# Patient Record
Sex: FEMALE | Race: BLACK OR AFRICAN AMERICAN | NOT HISPANIC OR LATINO | Employment: OTHER | URBAN - METROPOLITAN AREA
[De-identification: names, ages, dates, MRNs, and addresses within clinical notes are randomized per-mention and may not be internally consistent; named-entity substitution may affect disease eponyms.]

---

## 2017-01-25 ENCOUNTER — OFFICE VISIT (OUTPATIENT)
Dept: FAMILY MEDICINE | Facility: CLINIC | Age: 47
End: 2017-01-25

## 2017-01-25 VITALS
DIASTOLIC BLOOD PRESSURE: 88 MMHG | HEIGHT: 65 IN | OXYGEN SATURATION: 100 % | BODY MASS INDEX: 34.19 KG/M2 | SYSTOLIC BLOOD PRESSURE: 136 MMHG | HEART RATE: 69 BPM | TEMPERATURE: 98.1 F | WEIGHT: 205.2 LBS

## 2017-01-25 DIAGNOSIS — G56.01 CARPAL TUNNEL SYNDROME OF RIGHT WRIST: Primary | ICD-10-CM

## 2017-01-25 DIAGNOSIS — M77.11 LATERAL EPICONDYLITIS OF RIGHT ELBOW: ICD-10-CM

## 2017-01-25 RX ORDER — PREDNISONE 20 MG/1
30 TABLET ORAL DAILY
Qty: 8 TABLET | Refills: 0 | Status: SHIPPED | OUTPATIENT
Start: 2017-01-25 | End: 2017-02-09

## 2017-01-25 NOTE — PROGRESS NOTES
"There are no exam notes on file for this visit.  Chief Complaint   Patient presents with     Other     have right arm and right foot pain for quit a while now per pt.      Blood pressure 136/88, pulse 69, temperature 98.1  F (36.7  C), temperature source Oral, height 5' 5\" (165.1 cm), weight 205 lb 3.2 oz (93.078 kg), last menstrual period 01/14/2017, SpO2 100 %.    Assessment and Plan   Continued dictation on Beba Rosa:     ASSESSMENT AND PLAN:     1.  Lateral epicondylitis of right elbow:  Significant pain was noted to palpation over the lateral epicondyle and radial head.  I recommended ice and rest.  We'll give patient a work restriction.  Steroids will be used for carpal tunnel syndrome to help this as well.  She'll return in a few weeks and consider physical therapy if no significant improvement.   2.  Carpal tunnel syndrome of the right wrist:   We'll give patient new splint for the right wrist to wear at night and at work.  I recommend that she doesn't lift more than 10 pounds at work for the next few weeks We'll avoid NSAIDs and try prednisone 30 mg a day x five days.  Most recent AAFP article states that NSAIDs offer very little benefit with carpal tunnel syndrome.  We'll consider injection if not much relief from above modalities.  Return in two weeks for evaluation of both of these conditions.         Options for treatment and follow-up care were reviewed with the patient and/or guardian. Beba Rosa and/or guardian engaged in the decision making process and verbalized understanding of the options discussed and agreed with the final plan.  Patient discussed with Dr. Calvert.   DO DONALDO Garcia       Beba Rosa is a 46 year old  female SUBJECTIVE:  Beba Rosa comes in today for numbness of the fingers, as well as right arm pain.     Patient tells me that she was told she had carpal tunnel syndrome a number of months ago (some time in the middle of 2016).  She was given wrist " splint to wear at night which helped her symptoms, but that has since broken.  She also took ibuprofen for a short time, with minimal relief.  She says that this new episode of right arm pain is located just distal to the elbow on the extensor surface, and has gone on for just a few days.  The numbness in the fingers is ongoing, and she notices it especially at night and early in the morning.  She doesn't recall any recent injury or trauma.  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 Also notes trouble grasping remote and things in the refrigerator.  She denies neck pain, shoulder pain, fever, or chills.         Patient Active Problem List   Diagnosis     Health Care Home     Herpes simplex virus (HSV) infection     Illiteracy and low-level literacy     Abnormal Pap smear of cervix     Chlamydia     Current Outpatient Prescriptions   Medication Sig Dispense Refill     predniSONE (DELTASONE) 20 MG tablet Take 1.5 tablets (30 mg) by mouth daily 8 tablet 0     ibuprofen (ADVIL,MOTRIN) 400 MG tablet Take 1 tablet (400 mg) by mouth every 6 hours as needed for moderate pain 120 tablet 0     ferrous sulfate (IRON) 325 (65 FE) MG tablet Take 1 tablet (325 mg) by mouth 2 times daily 30 tablet 5     Cholecalciferol (VITAMIN D) 2000 UNITS tablet Take 2,000 Units by mouth daily 100 tablet 3     baclofen (LIORESAL) 10 MG tablet Take 0.5 tablets (5 mg) by mouth 3 times daily 20 tablet 0     metroNIDAZOLE (FLAGYL) 500 MG tablet Take 1 tablet (500 mg) by mouth 2 times daily 14 tablet 0     famciclovir (FAMVIR) 500 MG tablet Take 3 tablets (1,500 mg) by mouth once for 1 dose 3 tablet 3     Allergies   Allergen Reactions     Tizanidine      Causes lightheadedness, dizziness, and severe abdominal pain.      Family History   Problem Relation Age of Onset     DIABETES Mother      Hypertension Mother      Breast Cancer Mother      DIABETES Maternal Grandmother       "Coronary Artery Disease No family hx of      Hyperlipidemia No family hx of      Cancer - colorectal No family hx of      Ovarian Cancer No family hx of      Prostate Cancer No family hx of      Other Cancer No family hx of      Mental Illness No family hx of      CEREBROVASCULAR DISEASE No family hx of      Anesthesia Reaction No family hx of      Asthma No family hx of      OSTEOPOROSIS No family hx of      Known Genetic Syndrome No family hx of      Obesity No family hx of      Unknown/Adopted No family hx of      Colon Cancer No family hx of      Depression No family hx of      Anxiety Disorder No family hx of      MENTAL ILLNESS No family hx of      Substance Abuse No family hx of      Genetic Disorder No family hx of      Thyroid Disease No family hx of      No results found for this or any previous visit (from the past 24 hour(s)).         Review of Systems:   As Above             Physical Exam:     Filed Vitals:    01/25/17 1310   BP: 136/88   Pulse: 69   Temp: 98.1  F (36.7  C)   TempSrc: Oral   Height: 5' 5\" (165.1 cm)   Weight: 205 lb 3.2 oz (93.078 kg)   SpO2: 100%     Body mass index is 34.15 kg/(m^2).    GENERAL: healthy, alert, well nourished, well hydrated, no distress  MS: extremities- Continued dictation on Beba Shelley:    OBJECTIVE:     MUSCULOSKELETAL:  There is tenderness to palpation over the lateral epicondyle and radial head.  No bruising noted over that area.  No swelling noted over that area.  Mild tenderness noted over the extensor surface of the forearm, just distal to the elbow joint.  Positive Tinel's sign and Phalen sign.  The patient endorses numbness over the second, third, and fourth digit fingertips with those maneuvers.  No obvious atrophy of the thenar eminence.         Office Visit on 10/11/2016   Component Date Value Ref Range Status     Lab AP Case Report 10/11/2016 SEE RESULTS BELOW   Final    Comment: Gynecologic Cytology Report                       Case: [id number]         "                             Authorizing Provider:  Luis Carlos Mauricio MD         Collected:             10/11/2016 1335              First Screen:          Rosemaryreji Villalba, CT    Received:              10/12/2016 0919                                     (ASCP)                                                                         Rescreen:              JAROCHO Garrett (ASCP)                                                       Specimen:    SUREPATH PAP, SCREENING, Endocervical/cervical                                                Lab AP Gyn Interpretation 10/11/2016 SEE RESULTS BELOW   Final    Comment: Negative for squamous intraepithelial lesion or malignancy  Electronically signed by JAROCHO Garrett (ASCP) on 10/19/2016 at  2:01 PM       Lab AP Malignant? 10/11/2016 Normal  Normal Final     Specimen adequacy: 10/11/2016 Satisfactory for evaluation, endocervical/transformation zone component present   Final     HPV Reflex? 10/11/2016 Yes regardless of result   Final     High Risk? 10/11/2016 Yes   Final     Last Mens Period 10/11/2016 oct 1 2016   Final     Lab AP Abnormal Bleeding 10/11/2016 No   Final     Lab AP Patient Status 10/11/2016 not applicable   Final     Lab AP Birth Control/Hormones 10/11/2016 None   Final     Lab AP Previous Normal 10/11/2016 8/2011   Final     Lab AP Previous Abnl 10/11/2016 7/2015, ASCUS high risk HPV   Final     Lab AP Cervical Appearance 10/11/2016 normal   Final     Interpretation 10/11/2016 No HPV Type(s) Detected  No HPV Type(s) Detected, No High Risk HP Final      10/11/2016 Chuckie Michelle MD, [facility]   Final    Comment:    Testing was performed at [facility], [address] [place] [id number], with final verification at the indicated laboratory.    Interpretation was performed at [facility] [address] [place] [address].     .

## 2017-01-25 NOTE — MR AVS SNAPSHOT
After Visit Summary   1/25/2017    Beba Rosa    MRN: [id number]           Patient Information     Date Of Birth          1970        Visit Information        Provider Department      1/25/2017 1:10 PM Chucky Thorne DO [facility]        Today's Diagnoses     Carpal tunnel syndrome of right wrist    -  1       Care Instructions    Lateral epicondylitis (tennis elbow): pain over outside part of elbow due to swelling and inflammation, steroids, ice, rest    Carpel tunel: Brace and steroids    Tennis Elbow  Muscles connect to bones by thick, fibrous cords (tendons). When the muscles are overused by repeated motion, the tendons may become inflamed and painful. This condition is called tendonitis.  Tennis elbow (lateral epicondylitis) is a form of tendonitis. It occurs when the forearm muscles are used again and again in a twisting motion. Pain from tennis elbow occurs mainly on the outside of the elbow. But the pain can spread into the forearm and wrist. Your elbow may also be swollen and tender to the touch.  The pain may get worse when you move your arm or do simple activities. Bending your wrist back, shaking hands, or turning a doorknob may cause pain. The pain often gets worse after several weeks or months. Sometimes you may feel pain when your arm is still.  Tennis players who use a backhand stroke with poor technique are more likely to get tennis elbow. But playing tennis is only one cause of tennis elbow. Other common activities that can cause it include:    Hammering    Painting    Raking  Besides tennis players, people at risk include , gardeners, musicians, and dentists. Sometimes people get tennis elbow without doing anything that would cause the injury.  Treatment includes resting the arm and taking anti-inflammatory medicines. Special splints help ease symptoms. Symptoms should get better after 4 to 6 weeks of rest. You may need steroid injections if resting and  using a splint don t help. After the pain is relieved, you should change your activities so the symptoms don t return. You may need physical therapy. It may include stretching, range-of-motion, and strengthening exercises. These treatments help most cases. You may need surgery if your symptoms continue for 6 months.  Home care  Follow these guidelines when caring for yourself at home:    Rest your elbow as needed. Protect it from movement that causes pain. You may be told to use a forearm splint at night to ease symptoms in the morning. Your health care provider may recommend a special wrap or splint to compress the muscles of the forearm. This can ease pain during daytime activities. As your symptoms get better, start to move your elbow more.    Put an ice pack on the injured area. Do this for 20 minutes every 1 to 2 hours the first day for pain relief. You can make an ice pack by wrapping a plastic bag of ice cubes in a thin towel. Continue using the ice pack 3 to 4 times a day for the next 2 days. Then use the ice pack as needed to ease pain and swelling.    You may use acetaminophen or ibuprofen to control pain, unless another pain medicine was prescribed. If you have chronic liver or kidney disease, talk with your health care provider before using these medicines. Also talk with your provider if you ve had a stomach ulcer or GI bleeding.    After your elbow heals, avoid the motion that caused your pain. Or learn to move in a way that causes less stress on the tendon. Using a forearm wrap may keep tennis elbow from happening again.    A tennis elbow strap may ease pain and keep you from further injury when you start playing tennis again. You can also lower your risk for injury by warming up before you play and cooling down afterward. You should also use the right equipment. For instance, make sure your racquet has the right  and is the right size for you.  Follow-up care  Follow up with your health care  provider, or as advised, if your symptoms don t get better after 1 week of treatment.  When to seek medical advice  Call your health care provider right away if any of these occur:    Redness over the painful area    Pain or swelling at the elbow gets worse    Any numbness or tingling in your arm, hands, or fingers    Unexplained fever over 101 F (37.8 C)     3144-8029 [employer]. [address] [place] [address]. All rights reserved. This information is not intended as a substitute for professional medical care. Always follow your healthcare professional's instructions.              Follow-ups after your visit        Who to contact     Please call your clinic at [phone] to:    Ask questions about your health    Make or cancel appointments    Discuss your medicines    Learn about your test results    Speak to your doctor   If you have compliments or concerns about an experience at your clinic, or if you wish to file a complaint, please contact [facility] Patient Relations at [phone] or email us at [email or web address]         Additional Information About Your Visit        [employer] Information     [employer] is an electronic gateway that provides easy, online access to your medical records. With [employer], you can request a clinic appointment, read your test results, renew a prescription or communicate with your care team.     To sign up for [employer] visit the website at [email or web address]   You will be asked to enter the access code listed below, as well as some personal information. Please follow the directions to create your username and password.     Your access code is: [email or web address]  Expires: 2017  2:06 PM     Your access code will  in 90 days. If you need help or a new code, please contact your [facility] or call [phone] for assistance.        Care EveryWhere ID     This is your Care  "EveryWhere ID. This could be used by other organizations to access your [facility] medical records  [id number]        Your Vitals Were     Pulse Temperature Height BMI (Body Mass Index) Pulse Oximetry Last Period    69 98.1  F (36.7  C) (Oral) 5' 5\" (165.1 cm) 34.15 kg/m2 100% 01/14/2017 (Approximate)       Blood Pressure from Last 3 Encounters:   01/25/17 136/88   10/14/16 111/76   10/11/16 124/84    Weight from Last 3 Encounters:   01/25/17 205 lb 3.2 oz (93.078 kg)   10/14/16 197 lb 3.2 oz (89.449 kg)   10/11/16 201 lb (91.173 kg)              Today, you had the following     No orders found for display         Today's Medication Changes          These changes are accurate as of: 1/25/17  2:06 PM.  If you have any questions, ask your nurse or doctor.               Start taking these medicines.        Dose/Directions    predniSONE 20 MG tablet   Commonly known as:  DELTASONE   Used for:  Carpal tunnel syndrome of right wrist   Started by:  Chucky Thorne DO        Dose:  30 mg   Take 1.5 tablets (30 mg) by mouth daily   Quantity:  8 tablet   Refills:  0            Where to get your medicines      These medications were sent to [facility] - [place], [place] - [address], [place] [address]     Phone:  [phone]    - predniSONE 20 MG tablet             Primary Care Provider Office Phone # Fax #    Kendra DO Yanick [phone]       [facility] [address] [place] [address]        Thank you!     Thank you for choosing [facility]  for your care. Our goal is always to provide you with excellent care. Hearing back from our patients is one way we can continue to improve our services. Please take a few minutes to complete the written survey that you may receive in the mail after your visit with us. Thank you!             Your Updated Medication List - Protect others around you: Learn how to safely use, store and throw away your medicines at " www.disposemymeds.org.          This list is accurate as of: 1/25/17  2:06 PM.  Always use your most recent med list.                   Brand Name Dispense Instructions for use    baclofen 10 MG tablet    LIORESAL    20 tablet    Take 0.5 tablets (5 mg) by mouth 3 times daily       famciclovir 500 MG tablet    FAMVIR    3 tablet    Take 3 tablets (1,500 mg) by mouth once for 1 dose       ferrous sulfate 325 (65 FE) MG tablet    IRON    30 tablet    Take 1 tablet (325 mg) by mouth 2 times daily       ibuprofen 400 MG tablet    ADVIL/MOTRIN    120 tablet    Take 1 tablet (400 mg) by mouth every 6 hours as needed for moderate pain       metroNIDAZOLE 500 MG tablet    FLAGYL    14 tablet    Take 1 tablet (500 mg) by mouth 2 times daily       predniSONE 20 MG tablet    DELTASONE    8 tablet    Take 1.5 tablets (30 mg) by mouth daily       vitamin D 2000 UNITS tablet     100 tablet    Take 2,000 Units by mouth daily

## 2017-01-25 NOTE — Clinical Note
January 25, 2017      Beba Rosa  [address]  [place] [address]        Dear Employer of Beba,    Patient will be out of work 1/25/17. Patient should have a ten pound lifting restriction from 1/26/17 through 2/8/17.      Sincerely,    Chucky Thorne, DO

## 2017-01-25 NOTE — PATIENT INSTRUCTIONS
Lateral epicondylitis (tennis elbow): pain over outside part of elbow due to swelling and inflammation, steroids, ice, rest    Carpel tunel: Brace and steroids    Tennis Elbow  Muscles connect to bones by thick, fibrous cords (tendons). When the muscles are overused by repeated motion, the tendons may become inflamed and painful. This condition is called tendonitis.  Tennis elbow (lateral epicondylitis) is a form of tendonitis. It occurs when the forearm muscles are used again and again in a twisting motion. Pain from tennis elbow occurs mainly on the outside of the elbow. But the pain can spread into the forearm and wrist. Your elbow may also be swollen and tender to the touch.  The pain may get worse when you move your arm or do simple activities. Bending your wrist back, shaking hands, or turning a doorknob may cause pain. The pain often gets worse after several weeks or months. Sometimes you may feel pain when your arm is still.  Tennis players who use a backhand stroke with poor technique are more likely to get tennis elbow. But playing tennis is only one cause of tennis elbow. Other common activities that can cause it include:    Hammering    Painting    Raking  Besides tennis players, people at risk include , gardeners, musicians, and dentists. Sometimes people get tennis elbow without doing anything that would cause the injury.  Treatment includes resting the arm and taking anti-inflammatory medicines. Special splints help ease symptoms. Symptoms should get better after 4 to 6 weeks of rest. You may need steroid injections if resting and using a splint don t help. After the pain is relieved, you should change your activities so the symptoms don t return. You may need physical therapy. It may include stretching, range-of-motion, and strengthening exercises. These treatments help most cases. You may need surgery if your symptoms continue for 6 months.  Home care  Follow these guidelines when caring  for yourself at home:    Rest your elbow as needed. Protect it from movement that causes pain. You may be told to use a forearm splint at night to ease symptoms in the morning. Your health care provider may recommend a special wrap or splint to compress the muscles of the forearm. This can ease pain during daytime activities. As your symptoms get better, start to move your elbow more.    Put an ice pack on the injured area. Do this for 20 minutes every 1 to 2 hours the first day for pain relief. You can make an ice pack by wrapping a plastic bag of ice cubes in a thin towel. Continue using the ice pack 3 to 4 times a day for the next 2 days. Then use the ice pack as needed to ease pain and swelling.    You may use acetaminophen or ibuprofen to control pain, unless another pain medicine was prescribed. If you have chronic liver or kidney disease, talk with your health care provider before using these medicines. Also talk with your provider if you ve had a stomach ulcer or GI bleeding.    After your elbow heals, avoid the motion that caused your pain. Or learn to move in a way that causes less stress on the tendon. Using a forearm wrap may keep tennis elbow from happening again.    A tennis elbow strap may ease pain and keep you from further injury when you start playing tennis again. You can also lower your risk for injury by warming up before you play and cooling down afterward. You should also use the right equipment. For instance, make sure your racquet has the right  and is the right size for you.  Follow-up care  Follow up with your health care provider, or as advised, if your symptoms don t get better after 1 week of treatment.  When to seek medical advice  Call your health care provider right away if any of these occur:    Redness over the painful area    Pain or swelling at the elbow gets worse    Any numbness or tingling in your arm, hands, or fingers    Unexplained fever over 101 F (37.8 C)      6785-0349 [employer]. [address] [place] [address], [place] [address]. All rights reserved. This information is not intended as a substitute for professional medical care. Always follow your healthcare professional's instructions.

## 2017-02-09 ENCOUNTER — OFFICE VISIT (OUTPATIENT)
Dept: FAMILY MEDICINE | Facility: CLINIC | Age: 47
End: 2017-02-09

## 2017-02-09 VITALS
OXYGEN SATURATION: 98 % | WEIGHT: 205 LBS | SYSTOLIC BLOOD PRESSURE: 131 MMHG | DIASTOLIC BLOOD PRESSURE: 86 MMHG | HEART RATE: 70 BPM | HEIGHT: 65 IN | TEMPERATURE: 98.6 F | BODY MASS INDEX: 34.16 KG/M2

## 2017-02-09 DIAGNOSIS — M77.11 LATERAL EPICONDYLITIS OF RIGHT ELBOW: Primary | ICD-10-CM

## 2017-02-09 DIAGNOSIS — G56.02 CARPAL TUNNEL SYNDROME OF LEFT WRIST: ICD-10-CM

## 2017-02-09 RX ORDER — NAPROXEN 500 MG/1
500 TABLET ORAL 2 TIMES DAILY WITH MEALS
Qty: 30 TABLET | Refills: 1 | Status: SHIPPED | OUTPATIENT
Start: 2017-02-09 | End: 2017-07-06

## 2017-02-09 NOTE — PROGRESS NOTES
"Preceptor attestation:  Blood pressure 136/88, pulse 69, temperature 98.1  F (36.7  C), temperature source Oral, height 5' 5\" (165.1 cm), weight 205 lb 3.2 oz (93.078 kg), last menstrual period 01/14/2017, SpO2 100 %.  Patient seen and discussed with the resident.  Assessment and plan reviewed with resident and agreed upon.  Supervising physician: Graham Calvert  [facility]  "

## 2017-02-09 NOTE — PROGRESS NOTES
Preceptor attestation:  Patient seen and discussed with the resident. Assessment and plan reviewed with resident and agreed upon.  Supervising physician: Roni Hunter  [facility]

## 2017-02-09 NOTE — MR AVS SNAPSHOT
After Visit Summary   2/9/2017    Beba Rosa    MRN: [id number]           Patient Information     Date Of Birth          1970        Visit Information        Provider Department      2/9/2017 11:00 AM Chucky Thorne DO [facility]        Today's Diagnoses     Lateral epicondylitis of right elbow    -  1       Care Instructions    - wrist splints for carpel tunnel  - naproxen 500 mg twice a day with food for wrist and elbow pain  - physical therapy referral for elbow pain (lateral epicondylitis)        Follow-ups after your visit        Additional Services     PHYSICAL THERAPY REFERRAL       PT/OT REFERRAL  Beba Rosa  1970  Phone #: [phone] (home)    needed: No  Language: English    PT/OT for lateral epicondylitis of right elbow  Facility:                  Future tests that were ordered for you today     Open Future Orders        Priority Expected Expires Ordered    PHYSICAL THERAPY REFERRAL Routine  2/9/2019 2/9/2017            Who to contact     Please call your clinic at [phone] to:    Ask questions about your health    Make or cancel appointments    Discuss your medicines    Learn about your test results    Speak to your doctor   If you have compliments or concerns about an experience at your clinic, or if you wish to file a complaint, please contact [facility] Patient Relations at [phone] or email us at [email or web address]         Additional Information About Your Visit        MyChart Information     [employer] is an electronic gateway that provides easy, online access to your medical records. With [employer], you can request a clinic appointment, read your test results, renew a prescription or communicate with your care team.     To sign up for [employer] visit the website at [email or web address]   You will be asked to enter the access code listed below, as well as some personal information. Please follow  "the directions to create your username and password.     Your access code is: [email or web address]  Expires: 2017  2:06 PM     Your access code will  in 90 days. If you need help or a new code, please contact your [facility] or call [phone] for assistance.        Care EveryWhere ID     This is your Care EveryWhere ID. This could be used by other organizations to access your [facility] medical records  [id number]        Your Vitals Were     Pulse Temperature Height BMI (Body Mass Index) Pulse Oximetry Last Period    70 98.6  F (37  C) (Oral) 5' 5\" (165.1 cm) 34.11 kg/m2 98% 2017 (Approximate)       Blood Pressure from Last 3 Encounters:   17 131/86   17 136/88   10/14/16 111/76    Weight from Last 3 Encounters:   17 205 lb (92.987 kg)   17 205 lb 3.2 oz (93.078 kg)   10/14/16 197 lb 3.2 oz (89.449 kg)                 Today's Medication Changes          These changes are accurate as of: 17 11:24 AM.  If you have any questions, ask your nurse or doctor.               Start taking these medicines.        Dose/Directions    naproxen 500 MG tablet   Commonly known as:  NAPROSYN   Used for:  Lateral epicondylitis of right elbow   Started by:  Chucky Thorne DO        Dose:  500 mg   Take 1 tablet (500 mg) by mouth 2 times daily (with meals)   Quantity:  30 tablet   Refills:  1            Where to get your medicines      These medications were sent to [facility] - [place], [place] - [address], [place] [address]     Phone:  [phone]    - naproxen 500 MG tablet             Primary Care Provider Office Phone # Fax #    Kendra DO Yanick [phone]       [facility] [address] [place] [address]        Thank you!     Thank you for choosing [facility]  for your care. Our goal is always to provide you with excellent care. Hearing back from our patients is one way we can continue to " improve our services. Please take a few minutes to complete the written survey that you may receive in the mail after your visit with us. Thank you!             Your Updated Medication List - Protect others around you: Learn how to safely use, store and throw away your medicines at www.disposemymeds.org.          This list is accurate as of: 2/9/17 11:24 AM.  Always use your most recent med list.                   Brand Name Dispense Instructions for use    baclofen 10 MG tablet    LIORESAL    20 tablet    Take 0.5 tablets (5 mg) by mouth 3 times daily       famciclovir 500 MG tablet    FAMVIR    3 tablet    Take 3 tablets (1,500 mg) by mouth once for 1 dose       ferrous sulfate 325 (65 FE) MG tablet    IRON    30 tablet    Take 1 tablet (325 mg) by mouth 2 times daily       ibuprofen 400 MG tablet    ADVIL/MOTRIN    120 tablet    Take 1 tablet (400 mg) by mouth every 6 hours as needed for moderate pain       metroNIDAZOLE 500 MG tablet    FLAGYL    14 tablet    Take 1 tablet (500 mg) by mouth 2 times daily       naproxen 500 MG tablet    NAPROSYN    30 tablet    Take 1 tablet (500 mg) by mouth 2 times daily (with meals)       predniSONE 20 MG tablet    DELTASONE    8 tablet    Take 1.5 tablets (30 mg) by mouth daily       vitamin D 2000 UNITS tablet     100 tablet    Take 2,000 Units by mouth daily

## 2017-02-09 NOTE — PATIENT INSTRUCTIONS
- wrist splints for carpel tunnel  - naproxen 500 mg twice a day with food for wrist and elbow pain  - physical therapy referral for elbow pain (lateral epicondylitis)    [facility]  Physical Therapy  [phone]  Referral has been faxed they will contact pt to schedule  Geno Doherty 9:36 AM 2/10/2017

## 2017-02-10 ENCOUNTER — AMBULATORY - HEALTHEAST (OUTPATIENT)
Dept: ADMINISTRATIVE | Facility: REHABILITATION | Age: 47
End: 2017-02-10

## 2017-02-10 DIAGNOSIS — M77.11 LATERAL EPICONDYLITIS OF RIGHT ELBOW: ICD-10-CM

## 2017-02-10 NOTE — PROGRESS NOTES
"Nursing Notes:   Arnol Loyola CMA  2/9/2017 10:49 AM  Signed  Decline flu shot today  Chief Complaint   Patient presents with     RECHECK     follow up wrist pain     Blood pressure 131/86, pulse 70, temperature 98.6  F (37  C), temperature source Oral, height 5' 5\" (165.1 cm), weight 205 lb (92.987 kg), last menstrual period 01/14/2017, SpO2 98 %.    Assessment and Plan   (M77.11) Lateral epicondylitis of right elbow  (primary encounter diagnosis)  Comment: Ongoing  Plan: Naproxen 500 mg twice a day with food for 2 weeks and will make physical therapy referral.  Recommended that she take it easy aware that she says that her work will likely not him Dilaudid have specific restrictions.    (G56.02) Carpal tunnel syndrome of left wrist  Comment: Likely due to overuse as she has been compensating with this arm  Plan: Hopefully naproxen will give her some relief here as well and gave her a larger wrist splint to wear for the side.    F/U in a few weeks if no symptom improvement        Options for treatment and follow-up care were reviewed with the patient and/or guardian. Beba Shelley and/or guardian engaged in the decision making process and verbalized understanding of the options discussed and agreed with the final plan.  Patient discussed with Dr. Hunter.   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    Patient says her left carpal tunnel symptoms have essentially resolved.  She is no longer having numbness or tingling in the tips of her left fingers.  No wrist discomfort.  She does not feel like she is dropping objects easily anymore in her left hand.  She used the brace we gave her for a little while but says it wore out and was a little tighter than she expected.  She did not take the prednisone after she read more about the side " effects due to anxiety over the side effects.     Shestates that she is having tingling and numbness in the tips of her second through fourth fingers on the left hand she has been using that hand more work.  She has a minor wrist discomfort on that hand as well.  She says the tingling is worse at the end of work days.  She has not woken up at night from it.  She has taken ibuprofen p.m. for 2 nights for this with minimal relief.  She is unsure if she has had problems with carpal tunnel symptoms in this wrist before    In regards to her lateral epicondylitis since this is a little bit better but not significantly better.  She has been using heat and ice and she says ice helps most.  She has not been doing home exercises for this.    No neck pain, shoulder pain bilaterally, weakness with hand .    Patient Active Problem List   Diagnosis     Health Care Home     Herpes simplex virus (HSV) infection     Illiteracy and low-level literacy     Abnormal Pap smear of cervix     Chlamydia     Lateral epicondylitis of right elbow     Carpal tunnel syndrome of right wrist     Current Outpatient Prescriptions   Medication Sig Dispense Refill     naproxen (NAPROSYN) 500 MG tablet Take 1 tablet (500 mg) by mouth 2 times daily (with meals) 30 tablet 1     Cholecalciferol (VITAMIN D) 2000 UNITS tablet Take 2,000 Units by mouth daily 100 tablet 3     ferrous sulfate (IRON) 325 (65 FE) MG tablet Take 1 tablet (325 mg) by mouth 2 times daily 30 tablet 5     baclofen (LIORESAL) 10 MG tablet Take 0.5 tablets (5 mg) by mouth 3 times daily 20 tablet 0     metroNIDAZOLE (FLAGYL) 500 MG tablet Take 1 tablet (500 mg) by mouth 2 times daily 14 tablet 0     famciclovir (FAMVIR) 500 MG tablet Take 3 tablets (1,500 mg) by mouth once for 1 dose 3 tablet 3     Allergies   Allergen Reactions     Tizanidine      Causes lightheadedness, dizziness, and severe abdominal pain.      Family History   Problem Relation Age of Onset     DIABETES Mother   "    Hypertension Mother      Breast Cancer Mother      DIABETES Maternal Grandmother      Coronary Artery Disease No family hx of      Hyperlipidemia No family hx of      Cancer - colorectal No family hx of      Ovarian Cancer No family hx of      Prostate Cancer No family hx of      Other Cancer No family hx of      Mental Illness No family hx of      CEREBROVASCULAR DISEASE No family hx of      Anesthesia Reaction No family hx of      Asthma No family hx of      OSTEOPOROSIS No family hx of      Known Genetic Syndrome No family hx of      Obesity No family hx of      Unknown/Adopted No family hx of      Colon Cancer No family hx of      Depression No family hx of      Anxiety Disorder No family hx of      MENTAL ILLNESS No family hx of      Substance Abuse No family hx of      Genetic Disorder No family hx of      Thyroid Disease No family hx of      No results found for this or any previous visit (from the past 24 hour(s)).         Review of Systems:   As Above             Physical Exam:     Filed Vitals:    02/09/17 1047   BP: 131/86   Pulse: 70   Temp: 98.6  F (37  C)   TempSrc: Oral   Height: 5' 5\" (165.1 cm)   Weight: 205 lb (92.987 kg)   SpO2: 98%     Body mass index is 34.11 kg/(m^2).    Circulatory: Radial pulses 2 out 2 bilaterally  Musculoskeletal: Pinpoint tenderness over the lateral epicondyle on the right elbow still.  Pain with extension and eversion of right forearm.  No thenar atrophy in both hands bilaterally.  Tinel's sign  positive on the left wrist but now negative on the right wrist.  Positive Phalen's test for carpal tunnel numbness and tingling noted in the left wrist.  Handgrip strength 5 out of 5 bilaterally, wrist extensors and flexors are identified bilaterally.    Office Visit on 10/11/2016   Component Date Value Ref Range Status     Lab AP Case Report 10/11/2016 SEE RESULTS BELOW   Final    Comment: Gynecologic Cytology Report                       Case: [id number]                       "               Authorizing Provider:  Luis Carlos Mauricio MD         Collected:             10/11/2016 1335              First Screen:          Rosemaryreji Villalba, CT    Received:              10/12/2016 0919                                     (ASCP)                                                                         Rescreen:              JAROCHO Garrett (ASCP)                                                       Specimen:    SUREPATH PAP, SCREENING, Endocervical/cervical                                                Lab AP Gyn Interpretation 10/11/2016 SEE RESULTS BELOW   Final    Comment: Negative for squamous intraepithelial lesion or malignancy  Electronically signed by JAROCHO Garrett (ASCP) on 10/19/2016 at  2:01 PM       Lab AP Malignant? 10/11/2016 Normal  Normal Final     Specimen adequacy: 10/11/2016 Satisfactory for evaluation, endocervical/transformation zone component present   Final     HPV Reflex? 10/11/2016 Yes regardless of result   Final     High Risk? 10/11/2016 Yes   Final     Last Mens Period 10/11/2016 oct 1 2016   Final     Lab AP Abnormal Bleeding 10/11/2016 No   Final     Lab AP Patient Status 10/11/2016 not applicable   Final     Lab AP Birth Control/Hormones 10/11/2016 None   Final     Lab AP Previous Normal 10/11/2016 8/2011   Final     Lab AP Previous Abnl 10/11/2016 7/2015, ASCUS high risk HPV   Final     Lab AP Cervical Appearance 10/11/2016 normal   Final     Interpretation 10/11/2016 No HPV Type(s) Detected  No HPV Type(s) Detected, No High Risk HP Final      10/11/2016 Chuckie Michelle MD, [facility]   Final    Comment:    Testing was performed at [facility], [address] [place] [address], with final verification at the indicated laboratory.    Interpretation was performed at [facility] [address] [place] [address].

## 2017-05-23 ENCOUNTER — OFFICE VISIT (OUTPATIENT)
Dept: FAMILY MEDICINE | Facility: CLINIC | Age: 47
End: 2017-05-23

## 2017-05-23 VITALS
TEMPERATURE: 97.9 F | HEART RATE: 72 BPM | SYSTOLIC BLOOD PRESSURE: 112 MMHG | DIASTOLIC BLOOD PRESSURE: 78 MMHG | HEIGHT: 65 IN | WEIGHT: 202.6 LBS | BODY MASS INDEX: 33.76 KG/M2

## 2017-05-23 DIAGNOSIS — Z09 HOSPITAL DISCHARGE FOLLOW-UP: Primary | ICD-10-CM

## 2017-05-23 NOTE — MR AVS SNAPSHOT
After Visit Summary   2017    Beba Rosa    MRN: [id number]           Patient Information     Date Of Birth          1970        Visit Information        Provider Department      2017 9:40 AM Kendra Herndon DO [facility]        Today's Diagnoses     Hospital discharge follow-up    -  1       Follow-ups after your visit        Your next 10 appointments already scheduled     2017  9:40 AM CDT   Return Visit with DO Ajay Horner [facility] ([facility])    [address] [place] [address]   [phone]              Who to contact     Please call your clinic at [phone] to:    Ask questions about your health    Make or cancel appointments    Discuss your medicines    Learn about your test results    Speak to your doctor   If you have compliments or concerns about an experience at your clinic, or if you wish to file a complaint, please contact [facility] Patient Relations at [phone] or email us at [email or web address]         Additional Information About Your Visit        MyChart Information     [employer] is an electronic gateway that provides easy, online access to your medical records. With [employer], you can request a clinic appointment, read your test results, renew a prescription or communicate with your care team.     To sign up for [employer] visit the website at [email or web address]   You will be asked to enter the access code listed below, as well as some personal information. Please follow the directions to create your username and password.     Your access code is: [email or web address]  Expires: 2017  8:45 AM     Your access code will  in 90 days. If you need help or a new code, please contact your [facility] or call [phone] for assistance.        Care EveryWhere ID     This is your Care EveryWhere ID. This could be used by other organizations to access your  "[facility] medical records  [id number]        Your Vitals Were     Pulse Temperature Height BMI (Body Mass Index)          72 97.9  F (36.6  C) (Oral) 5' 4.57\" (164 cm) 34.17 kg/m2         Blood Pressure from Last 3 Encounters:   05/23/17 112/78   02/09/17 131/86   01/25/17 136/88    Weight from Last 3 Encounters:   05/23/17 202 lb 9.6 oz (91.9 kg)   02/09/17 205 lb (93 kg)   01/25/17 205 lb 3.2 oz (93.1 kg)              Today, you had the following     No orders found for display       Primary Care Provider Office Phone # Fax #    Kendra Herndon [phone]       [facility] [address] [place] [address]        Thank you!     Thank you for choosing [facility]  for your care. Our goal is always to provide you with excellent care. Hearing back from our patients is one way we can continue to improve our services. Please take a few minutes to complete the written survey that you may receive in the mail after your visit with us. Thank you!             Your Updated Medication List - Protect others around you: Learn how to safely use, store and throw away your medicines at www.disposemymeds.org.          This list is accurate as of: 5/23/17 11:59 PM.  Always use your most recent med list.                   Brand Name Dispense Instructions for use    baclofen 10 MG tablet    LIORESAL    20 tablet    Take 0.5 tablets (5 mg) by mouth 3 times daily       famciclovir 500 MG tablet    FAMVIR    3 tablet    Take 3 tablets (1,500 mg) by mouth once for 1 dose       ferrous sulfate 325 (65 FE) MG tablet    IRON    30 tablet    Take 1 tablet (325 mg) by mouth 2 times daily       metroNIDAZOLE 500 MG tablet    FLAGYL    14 tablet    Take 1 tablet (500 mg) by mouth 2 times daily       naproxen 500 MG tablet    NAPROSYN    30 tablet    Take 1 tablet (500 mg) by mouth 2 times daily (with meals)       TYLENOL PO      Take 325 mg by mouth every 6 hours as needed for mild pain or fever       vitamin D 2000 UNITS " tablet     100 tablet    Take 2,000 Units by mouth daily

## 2017-05-25 NOTE — PROGRESS NOTES
"Subjective:   Beba Rosa is a 46 year old female with PMHx of  Patient Active Problem List    Diagnosis Date Noted     Lateral epicondylitis of right elbow 01/25/2017     Priority: Medium     Carpal tunnel syndrome of right wrist 01/25/2017     Priority: Medium     Chlamydia 12/26/2015     Priority: Medium     Treated on 12/26/15 with azithromycin x1dose. Will need retest in 3 mos.       Abnormal Pap smear of cervix      Priority: Medium     10/11/16 nl Pap with neg HPV.  Pt needs Pap/HPV in 1 yr and if both nl should have repeat Pap/HPV in 3 yrs due to ASCUS Pap cannot exclude HSIL in 7/2015.  9/24/15: Jaylen Calhoun colposcopy with sedation (pt did not tolerate colp without sedation at [facility]); ECC- benign cervical tissue and cervical bx- ectocervical tissue with keratosis without dysplasia. Pt needs repeat Pap in 6 months.  7/16/15: ASCUS Pap cannot exclude HSIL with high risk HPV. Patient needs colposcopy ASAP!!  8/25/11: nl Pap with negative HPV       Illiteracy and low-level literacy 05/12/2015     Priority: Medium     Health Care Home 12/27/2012     Priority: Medium     Tier Level: 0    DX V65.8 REPLACED WITH 22570 HEALTH CARE HOME (04/08/2013)       Herpes simplex virus (HSV) infection 12/27/2012     Priority: Medium     Problem list name updated by automated process. Provider to review         who presents for ER follow up. She was seen 5/13/17 at [facility] ED. She tells me that she finished work on Friday and was feeling very anxious so she smoked some week. Shortly after she states she was having vision changes, couldn't talk and felt \"very weird and out of it\". She thinks that the week she smoked was \"laced with something\". She tells me that her work up at [facility] was otherwise unremarkable and they discharged her home once her mentation improved. Since this ED visit she has not had any recurrent sx similar to those. She has been doing well but she was unable to work for a week after this " "incident because she was having a lot of anxiety due to this incident. Today she is feeling well and has no other concerns.    PMHX/PSHX/MEDS/ALLERGIES/SHX/FHX reviewed and updated in Epic.    ROS:  - HEENT: Negative       - CV: No CP  - Resp: No SOB, difficulty breathing, cough   - GI: Negative  - : No dysuria or hematuria    - MSK: Negative  - Neuro: Negative      - Skin: No rashes    Objective:   /78  Pulse 72  Temp 97.9  F (36.6  C) (Oral)  Ht 5' 4.57\" (164 cm)  Wt 202 lb 9.6 oz (91.9 kg)  BMI 34.17 kg/m2, Body mass index is 34.17 kg/(m^2).  Constitutional: healthy, a&o, nad  Psychiatric: mentation appears normal and affect normal/bright    Assesment & Plan:   Beba Rosa, 46 year old female, here for ED follow up.    (Z09) Hospital discharge follow-up  (primary encounter diagnosis)  Comment/Plan: Apparently had AMS after smoking week on 5/13, seen at [facility], thinks the week was \"laced with something\". She has not had any further issues or sx since this ED visit but was told to follow up here. No further treatment needed at this time as she is asymptomatic. VSS.          RTC as needed.    Preceptor: Dr. Branden Herndon PGY-3    "

## 2017-05-26 NOTE — PROGRESS NOTES
Preceptor attestation:  Patient seen and discussed with the resident. Assessment and plan reviewed with resident and agreed upon.  Supervising physician: Jameel Otero  [facility]

## 2017-06-08 ENCOUNTER — OFFICE VISIT (OUTPATIENT)
Dept: FAMILY MEDICINE | Facility: CLINIC | Age: 47
End: 2017-06-08

## 2017-06-08 VITALS
BODY MASS INDEX: 34.32 KG/M2 | OXYGEN SATURATION: 98 % | TEMPERATURE: 98.2 F | HEIGHT: 65 IN | HEART RATE: 66 BPM | WEIGHT: 206 LBS | DIASTOLIC BLOOD PRESSURE: 74 MMHG | SYSTOLIC BLOOD PRESSURE: 113 MMHG

## 2017-06-08 DIAGNOSIS — Z09 HOSPITAL DISCHARGE FOLLOW-UP: ICD-10-CM

## 2017-06-08 DIAGNOSIS — Z00.00 PREVENTATIVE HEALTH CARE: Primary | ICD-10-CM

## 2017-06-08 DIAGNOSIS — R21 RASH: ICD-10-CM

## 2017-06-08 NOTE — PROGRESS NOTES
Preceptor attestation:  Patient seen and discussed with the resident. Assessment and plan reviewed with resident and agreed upon.  Supervising physician: Kristy Merino  [facility]

## 2017-06-08 NOTE — PROGRESS NOTES
Subjective:   Beba Rosa is a 46 year old female with PMHx of  Patient Active Problem List    Diagnosis Date Noted     Lateral epicondylitis of right elbow 01/25/2017     Priority: Medium     Carpal tunnel syndrome of right wrist 01/25/2017     Priority: Medium     Chlamydia 12/26/2015     Priority: Medium     Treated on 12/26/15 with azithromycin x1dose. Will need retest in 3 mos.       Abnormal Pap smear of cervix      Priority: Medium     10/11/16 nl Pap with neg HPV.  Pt needs Pap/HPV in 1 yr and if both nl should have repeat Pap/HPV in 3 yrs due to ASCUS Pap cannot exclude HSIL in 7/2015.  9/24/15: Jaylen Calhoun colposcopy with sedation (pt did not tolerate colp without sedation at [facility]); ECC- benign cervical tissue and cervical bx- ectocervical tissue with keratosis without dysplasia. Pt needs repeat Pap in 6 months.  7/16/15: ASCUS Pap cannot exclude HSIL with high risk HPV. Patient needs colposcopy ASAP!!  8/25/11: nl Pap with negative HPV       Illiteracy and low-level literacy 05/12/2015     Priority: Medium     Health Care Home 12/27/2012     Priority: Medium     Tier Level: 0    DX V65.8 REPLACED WITH 86455 HEALTH CARE HOME (04/08/2013)       Herpes simplex virus (HSV) infection 12/27/2012     Priority: Medium     Problem list name updated by automated process. Provider to review         who [facility] for [facility]. We had a visit to discuss this ED visit during our last visit but our EMR was down so I did not have access to the ED note, she is returning to review this today. I did not have any new information to share with her other than what she already knew about the ED visit - seen for AMS which cleared shortly after, etiology thought to be laced marijuana vs. K2. She has not had any further episodes of of AMS since this incident.     She is due for a mammogram, last one she tells me was done a few years ago and was normal. She does have a family hx of breast cancer in her  "mother.    She has a rash on her bottom that she would like looked at. Started 1 week ago, was causing itching and pain but started using a cream and she feels like it is almost gone and continues to improve but would like to have this looked at.    PMHX/PSHX/MEDS/ALLERGIES/SHX/FHX reviewed and updated in Epic.    ROS:  - HEENT: Negative       - CV: No CP  - Resp: No SOB, difficulty breathing, cough   - GI: Negative   - MSK: Negative  - Neuro: Negative        Objective:   /74  Pulse 66  Temp 98.2  F (36.8  C) (Oral)  Ht 5' 5\" (165.1 cm)  Wt 206 lb (93.4 kg)  LMP 05/06/2017 (Approximate)  SpO2 98%  BMI 34.28 kg/m2, Body mass index is 34.28 kg/(m^2).  Constitutional: healthy, a&o, nad  Cardiovascular: RRR, no m/g/r  Respiratory: BCTA, no wheezes/rhonchi or rales  Gastrointestinal: soft, nt/nd, BS+, no organomegaly  MSK: gross motor and sensation intact, gait normal, tone normal  Neuro: gross sensation intact, reflexes normal and symmetric  Skin: I cannot appreciate any rash or wound on the buttocks near the area where the patient points to.    Assesment & Plan:   Beba Rosa, 46 year old female, here for ER follow up and evaluation of rash.    (Z00.00) Preventative health care  (primary encounter diagnosis)  Plan: PCS Use: Screening Digital Bilateral Mammogram    (Z09) Hospital discharge follow-up  Comment/Plan: Discussed and reviewed [facility] ED records today. No further treatment/evaluation needed at this time. Has not had any further epidsodes of AMS.    (R21) Rash  Comment: I cannot appreciate a rash today near the area where patient points to. Her skin exam is normal and patient feels this rash has been improving.   Plan:   -continue to monitor, no further treatment needed at this time given normal skin exam.          I ended our visit today by discussing the patient's diagnoses and recommended treatment. Please refer to today's diagnoses and orders for further details. I briefly discussed the " pathophysiology of these conditions and outlined their expected course. I discussed the warning symptoms and signs that indicate an atypical course that would need urgent or emergent care. I also discussed self care strategies for symptom relief. Patient voiced complete understanding of plan of care and was in full agreement to proceed as discussed.    RTC in as needed.      Preceptor: Dr. Wilber Herndon PGY-3

## 2017-06-08 NOTE — MR AVS SNAPSHOT
After Visit Summary   2017    Beba Rosa    MRN: [id number]           Patient Information     Date Of Birth          1970        Visit Information        Provider Department      2017 11:20 AM Kendra Herndon DO [facility]        Today's Diagnoses     Preventative health care    -  1    Hospital discharge follow-up        Rash          Care Instructions    Faxed to [facility] for mammogram screening per patient's request.     Radha  2017              Follow-ups after your visit        Who to contact     Please call your clinic at [phone] to:    Ask questions about your health    Make or cancel appointments    Discuss your medicines    Learn about your test results    Speak to your doctor   If you have compliments or concerns about an experience at your clinic, or if you wish to file a complaint, please contact [facility] Patient Relations at [phone] or email us at [email or web address]         Additional Information About Your Visit        MyChart Information     MiTiot is an electronic gateway that provides easy, online access to your medical records. With [employer], you can request a clinic appointment, read your test results, renew a prescription or communicate with your care team.     To sign up for MiTiot visit the website at [email or web address]   You will be asked to enter the access code listed below, as well as some personal information. Please follow the directions to create your username and password.     Your access code is: [email or web address]  Expires: 2017  8:45 AM     Your access code will  in 90 days. If you need help or a new code, please contact your [facility] or call [phone] for assistance.        Care EveryWhere ID     This is your Care EveryWhere ID. This could be used by other organizations to access your [place] medical records  [id number]        Your Vitals  "Were     Pulse Temperature Height Last Period Pulse Oximetry BMI (Body Mass Index)    66 98.2  F (36.8  C) (Oral) 5' 5\" (165.1 cm) 05/06/2017 (Approximate) 98% 34.28 kg/m2       Blood Pressure from Last 3 Encounters:   06/08/17 113/74   05/23/17 112/78   02/09/17 131/86    Weight from Last 3 Encounters:   06/08/17 206 lb (93.4 kg)   05/23/17 202 lb 9.6 oz (91.9 kg)   02/09/17 205 lb (93 kg)               Primary Care Provider Office Phone # Fax #    Kendra Herndon [phone]       [facility] [address] [place] [address]        Thank you!     Thank you for choosing [facility]  for your care. Our goal is always to provide you with excellent care. Hearing back from our patients is one way we can continue to improve our services. Please take a few minutes to complete the written survey that you may receive in the mail after your visit with us. Thank you!             Your Updated Medication List - Protect others around you: Learn how to safely use, store and throw away your medicines at www.disposemymeds.org.          This list is accurate as of: 6/8/17 11:59 PM.  Always use your most recent med list.                   Brand Name Dispense Instructions for use    baclofen 10 MG tablet    LIORESAL    20 tablet    Take 0.5 tablets (5 mg) by mouth 3 times daily       famciclovir 500 MG tablet    FAMVIR    3 tablet    Take 3 tablets (1,500 mg) by mouth once for 1 dose       ferrous sulfate 325 (65 FE) MG tablet    IRON    30 tablet    Take 1 tablet (325 mg) by mouth 2 times daily       metroNIDAZOLE 500 MG tablet    FLAGYL    14 tablet    Take 1 tablet (500 mg) by mouth 2 times daily       naproxen 500 MG tablet    NAPROSYN    30 tablet    Take 1 tablet (500 mg) by mouth 2 times daily (with meals)       TYLENOL PO      Take 325 mg by mouth every 6 hours as needed for mild pain or fever       vitamin D 2000 UNITS tablet     100 tablet    Take 2,000 Units by mouth daily         "

## 2017-07-06 ENCOUNTER — OFFICE VISIT (OUTPATIENT)
Dept: FAMILY MEDICINE | Facility: CLINIC | Age: 47
End: 2017-07-06

## 2017-07-06 VITALS
DIASTOLIC BLOOD PRESSURE: 75 MMHG | TEMPERATURE: 98.1 F | WEIGHT: 201.8 LBS | HEART RATE: 71 BPM | BODY MASS INDEX: 33.58 KG/M2 | SYSTOLIC BLOOD PRESSURE: 130 MMHG

## 2017-07-06 DIAGNOSIS — B00.9 HSV (HERPES SIMPLEX VIRUS) INFECTION: Primary | ICD-10-CM

## 2017-07-06 RX ORDER — VALACYCLOVIR HYDROCHLORIDE 500 MG/1
500 TABLET, FILM COATED ORAL 2 TIMES DAILY
Qty: 6 TABLET | Refills: 3 | Status: SHIPPED | OUTPATIENT
Start: 2017-07-06 | End: 2017-10-10

## 2017-07-06 NOTE — PROGRESS NOTES
ASSESSMENT AND PLAN      Beba was seen today for oral swelling.    Diagnoses and all orders for this visit:    HSV (herpes simplex virus) infection  -     valACYclovir (VALTREX) 500 MG tablet; Take 1 tablet (500 mg) by mouth 2 times daily  - Informed patient to keep up with lip moisturizers.       RTC in 1 for follow up of upper lip rash or sooner if develops new or worsening symptoms.    Marko Amanda MD PGY2  [facility]                SUBJECTIVE       Beba Rosa is a 47 year old  female with a PMH significant for:     Patient Active Problem List   Diagnosis     Health Care Home     Herpes simplex virus (HSV) infection     Illiteracy and low-level literacy     Abnormal Pap smear of cervix     Chlamydia     Lateral epicondylitis of right elbow     Carpal tunnel syndrome of right wrist     Patient first noticed rash and swelling on her upper lip this morning. She has never had this before. No drainage noted. She also notes some tingling in the area and increased irritation. Patient has had increased stress lately and was out in the sun all day yesterday. Boyfriend has had this before. Patient has not had this in the past. No fever, chills over the last week. Has been noticing increased fatigue over the last 2 days. Patient also noticed full body muscle aches on Tuesday. Patient took tylenol with no change in symptoms. Patient does not note any rashes in any other areas at this time.     Patient is sexually active with men.     PMH, Medications and Allergies were reviewed and updated as needed.        REVIEW OF SYSTEMS     CONSTITUTIONAL: NEGATIVE for fever, chills, change in weight  INTEGUMENTARY/SKIN: See above  ENT/MOUTH: NEGATIVE for ear, mouth and throat problems          OBJECTIVE     Vitals:    07/06/17 1050   BP: 130/75   BP Location: Left arm   Patient Position: Chair   Pulse: 71   Temp: 98.1  F (36.7  C)   TempSrc: Oral   Weight: 201 lb 12.8 oz (91.5 kg)     Body mass index is 33.58  kg/(m^2).    Constitutional: Awake, alert, cooperative, no apparent distress, and appears stated age.  Eyes: Lids and lashes normal, extra ocular muscles intact, sclera clear, conjunctiva normal.  ENT: Normocephalic, without obvious abnormality, atramatic, sinuses nontender on palpation, external ears without lesions, oral pharynx with moist mucus membranes, tonsils without erythema or exudates, gums normal and good dentition.  Skin: Noted to have vesicular lesions along the left vermilion border of the left upper lip as well as the right edge of philtrum. Lesions are roofed, no drainage noted.    No results found for this or any previous visit (from the past 24 hour(s)).

## 2017-07-06 NOTE — MR AVS SNAPSHOT
After Visit Summary   2017    Beba Rosa    MRN: [id number]           Patient Information     Date Of Birth          1970        Visit Information        Provider Department      2017 10:40 AM Marko Amanda MD [facility]        Today's Diagnoses     HSV (herpes simplex virus) infection    -  1      Care Instructions    -Take 1 tablet twice a day for 3 days.           Follow-ups after your visit        Who to contact     Please call your clinic at [phone] to:    Ask questions about your health    Make or cancel appointments    Discuss your medicines    Learn about your test results    Speak to your doctor   If you have compliments or concerns about an experience at your clinic, or if you wish to file a complaint, please contact [facility] Patient Relations at [phone] or email us at [email or web address]         Additional Information About Your Visit        MyChart Information     Parudi is an electronic gateway that provides easy, online access to your medical records. With Parudi, you can request a clinic appointment, read your test results, renew a prescription or communicate with your care team.     To sign up for Infectioust visit the website at [email or web address]   You will be asked to enter the access code listed below, as well as some personal information. Please follow the directions to create your username and password.     Your access code is: [email or web address]  Expires: 2017  8:45 AM     Your access code will  in 90 days. If you need help or a new code, please contact your [facility] or call [phone] for assistance.        Care EveryWhere ID     This is your Care EveryWhere ID. This could be used by other organizations to access your [place] medical records  [id number]        Your Vitals Were     Pulse Temperature Last Period BMI (Body Mass Index)          71 98.1  F  (36.7  C) (Oral) 06/06/2017 (Approximate) 33.58 kg/m2         Blood Pressure from Last 3 Encounters:   07/06/17 130/75   06/08/17 113/74   05/23/17 112/78    Weight from Last 3 Encounters:   07/06/17 201 lb 12.8 oz (91.5 kg)   06/08/17 206 lb (93.4 kg)   05/23/17 202 lb 9.6 oz (91.9 kg)              Today, you had the following     No orders found for display         Today's Medication Changes          These changes are accurate as of: 7/6/17 11:06 AM.  If you have any questions, ask your nurse or doctor.               Start taking these medicines.        Dose/Directions    valACYclovir 500 MG tablet   Commonly known as:  VALTREX   Used for:  HSV (herpes simplex virus) infection   Started by:  Marko Amanda MD        Dose:  500 mg   Take 1 tablet (500 mg) by mouth 2 times daily   Quantity:  6 tablet   Refills:  3         Stop taking these medicines if you haven't already. Please contact your care team if you have questions.     baclofen 10 MG tablet   Commonly known as:  LIORESAL   Stopped by:  Marko Amanda MD           famciclovir 500 MG tablet   Commonly known as:  FAMVIR   Stopped by:  Marko Amanda MD           ferrous sulfate 325 (65 FE) MG tablet   Commonly known as:  IRON   Stopped by:  Marko Amanda MD           metroNIDAZOLE 500 MG tablet   Commonly known as:  FLAGYL   Stopped by:  Marko Amanda MD           naproxen 500 MG tablet   Commonly known as:  NAPROSYN   Stopped by:  Marko Amanda MD           TYLENOL PO   Stopped by:  Marko Amanda MD           vitamin D 2000 UNITS tablet   Stopped by:  Marko Amanda MD                Where to get your medicines      These medications were sent to [facility] - [place], [place] - [address], [place] [address]     Phone:  [phone]     valACYclovir 500 MG tablet                Primary Care Provider Office Phone # Fax #    Kendra HerndonDO [phone]        [facility] [address] [place] [address]        Equal Access to Services     NELDA CINTRON : Hadii ankit Ceballos, joanna santiago, ramirez acosta, tucker knutson. So [facility] [phone].    ATENCIÓN: Si habla español, tiene a fine disposición servicios gratuitos de asistencia lingüística. Llame al [phone].    We comply with applicable federal civil rights laws and [place] laws. We do not discriminate on the basis of race, color, national origin, age, disability sex, sexual orientation or gender identity.            Thank you!     Thank you for choosing [facility]  for your care. Our goal is always to provide you with excellent care. Hearing back from our patients is one way we can continue to improve our services. Please take a few minutes to complete the written survey that you may receive in the mail after your visit with us. Thank you!             Your Updated Medication List - Protect others around you: Learn how to safely use, store and throw away your medicines at www.disposemymeds.org.          This list is accurate as of: 7/6/17 11:06 AM.  Always use your most recent med list.                   Brand Name Dispense Instructions for use Diagnosis    valACYclovir 500 MG tablet    VALTREX    6 tablet    Take 1 tablet (500 mg) by mouth 2 times daily    HSV (herpes simplex virus) infection

## 2017-07-06 NOTE — PROGRESS NOTES
Preceptor attestation:  Patient seen and discussed with the resident. Assessment and plan reviewed with resident and agreed upon.  Supervising physician: Reji Jarvis  [facility]

## 2017-07-06 NOTE — PROGRESS NOTES
Patient has had increased stress lately. Boyfriend has had this before. Patient has not had this in the past. No fever, chills over the last week. Has been noticing increased fatigue over the last 2 days. Patient also noticed full body muscle aches on Tuesday. Patient took tylenol.

## 2017-10-10 ENCOUNTER — OFFICE VISIT (OUTPATIENT)
Dept: FAMILY MEDICINE | Facility: CLINIC | Age: 47
End: 2017-10-10

## 2017-10-10 VITALS
HEIGHT: 65 IN | HEART RATE: 67 BPM | WEIGHT: 202 LBS | SYSTOLIC BLOOD PRESSURE: 118 MMHG | TEMPERATURE: 98.4 F | DIASTOLIC BLOOD PRESSURE: 81 MMHG | BODY MASS INDEX: 33.66 KG/M2

## 2017-10-10 DIAGNOSIS — R87.610 ATYPICAL SQUAMOUS CELLS OF UNDETERMINED SIGNIFICANCE ON CYTOLOGIC SMEAR OF CERVIX (ASC-US): Primary | ICD-10-CM

## 2017-10-10 DIAGNOSIS — Z11.3 SCREEN FOR STD (SEXUALLY TRANSMITTED DISEASE): ICD-10-CM

## 2017-10-10 LAB
BACTERIA: NORMAL
CLUE CELLS: NORMAL
HIV 1+2 AB+HIV1 P24 AG SERPL QL IA: NEGATIVE
MOTILE TRICHOMONAS: NEGATIVE
ODOR: NORMAL
PH WET PREP: NORMAL
WBC WET PREP: NORMAL
YEAST: NORMAL

## 2017-10-10 NOTE — LETTER
October 25, 2017      Beba Rosa  [address]  [place] [address]        Dear Beba,    The results from your Pap smear was normal. No follow up is needed. At this time. You may plan to come back for PAP in 5 years per the new guidelines.    Thank you for allowing me to be a part of your health care!    Please see below for your test results.    Resulted Orders   HIV Ag/Ab Screen [place] ([facility])   Result Value Ref Range    HIV Antigen/Antibody Negative Negative    Narrative    Test performed by:  [facility] [address] [place] [address]   Syphilis Screen [place] (RPR/VDRL) ([facility])   Result Value Ref Range    Syphilis Screen [place] Non-Reactive Non-Reactive    Narrative    Test performed by:  [facility] [address] [place] [address]   GYN Cytology ([facility])   Result Value Ref Range    Lab AP Case Report SEE RESULTS BELOW       Comment:      Gynecologic Cytology Report                       Case: [id number]                                     Authorizing Provider:  Rani Carroll MD    Collected:             10/10/2017 1446              First Screen:          JAROCHO Flores   Received:              10/11/2017 1012                                     (ASCP)                                                                         Rescreen:              JAROCHO Aragon                                                                               (ASCP)                                                                         Specimen:    SUREPATH PAP, SCREENING, Endocervical/cervical                                               Lab AP Gyn Interpretation SEE RESULTS BELOW       Comment:      Negative for squamous intraepithelial lesion or malignancy  Electronically signed by JAROCHO Aragon (ASCP) on 10/16/2017 at 10:17   AM      Lab AP Malignant? Normal Normal    Lab AP Gyn Other Findings       Shift in vaginal chin  suggestive of bacterial vaginosis    Specimen adequacy:       Satisfactory for evaluation, endocervical/transformation zone component present    HPV Reflex? Yes regardless of result     High Risk? No     Last Mens Period 10/3/17     Lab AP Abnormal Bleeding No     Lab AP Patient Status Tubal     Lab AP Birth Control/Hormones None     Lab AP Previous Normal unknown     Lab AP Previous Abnl 10/11/2016     Lab AP Cervical Appearance normal     Narrative    Test performed by:  [facility] [address] [place] [address]   Wet Prep ([facility])   Result Value Ref Range    Yeast Wet Prep None none    Motile Trichomonas Wet Prep Negative Negative    Clue Cells Wet Prep Present <20% NONE    WBC WET PREP None 2 - 5    Bacteria Wet Prep Few None    pH Wet Prep Not performed 3.8 - 4.5    Odor Wet Prep None NONE   Chlamydia/Gono Amplified (Re[facility])   Result Value Ref Range    Chlamydia trac,Amplified Prb Negative Negative    N gonorrhoeae,Amplified Prb Negative Negative    Narrative    Test performed by:  [facility] [address] [place] [address]   HPV Addison PCR ([facility])   Result Value Ref Range    Interpretation No HPV Type(s) Detected No HPV Type(s) Detected, No High Risk HP     Chuckie Michelle MD, [facility]       Comment:         Testing was performed at [facility], [address] [place] [address], with final verification at the indicated laboratory.    Interpretation was performed at [facility] [address] [place], [place] [id number].      Narrative    Test performed by:  [facility] [address] [place] [address]  No HPV Type(s) Detected  Interpretation: The sample is negative for the presence of DNA from one or   more of the following high risk HPV types (16, 18, 31, 33, 35, 39, 45, 51, 52,   56, 58, 66, and 68) and/or probable high or low risk HPV types (2a, 6, 11,   26, 30, 32, 34, 42, 43, 44,  53, 54, 55, 57, 61, 62, 67, 69, 70, 71, 72, 73,   74, 77, 81, 82, 83, 84, 85, 87, 89).  High risk types are classified by the   IARC as carcinogenicity, probably, or possible carcinogenic to humans.    According to the IARC, low risk types are not classifiable as to their   carcingenicity to humans. These results do not exclude the possibility of   additional low or high risk HPV types not detected due to adequacy and   representativeness of sampling or assay sensitivity.  Comments: The absence of low and or high risk HPV types reduces the collective   risk for the development of cervical dysplasia or neoplasia.  This result, in   association with the morphology assessment of the Pap sample are zendejas   information for the determination of follow-up testing and in the clinical   management of the patient.  Methodology:  Genomic DNA was extracted from the submitted specimen and   amplified by the polymerase chain reaction (PCR) using consensus   oligonucleotide primers for the L1 region of the human papillomavirus (HPV)   genome.  Concurrently, the integrity of the extracted DNA was evaluated by the   amplification of beta-globin, a common housekeeping gene.  Result   interpretation is performed by analysis of peak height and size data generated   through fluorescence detection by automated electrophoresis.  HPV DNA   positive PCR products were subjected to digestion by the restriction   endonucleases Hae III, Pst 1, and Rsa 1.  Digested DNA fragments were    by automated electrophoresis.  Digital electropherograms and gel   images of data were generated and the specific HPV type was determined by   matching the restriction fragment patterns of the respective specimens to that   of known HPV restriction fragment patterns.  The analytical and performance   characteristics of this laboratory-developed test (LDT) were determined by   [facility] pursuant to Clinical Laboratory Improvement Amendments (CLIA 88)    requirements.  It has not been cleared or approved by the U.S. Food and Drug   Administration (FDA).  The FDA has determined that such clearance or approval   is not a requirement prior to use for clinical purposes.       If you have any questions, please call the clinic to make an appointment.    Sincerely,    Carlita Singh MD

## 2017-10-10 NOTE — PATIENT INSTRUCTIONS
Beba,     1. Make appointment for physical for health questions and preventative medicine  2. STI screen today  3. PAP today  4. Please stop at lab for blood draw    Thank you for allowing me to be a part of your health care team!    Sincerely,   Dr. Singh

## 2017-10-10 NOTE — MR AVS SNAPSHOT
After Visit Summary   10/10/2017    Beba Rosa    MRN: [id number]           Patient Information     Date Of Birth          1970        Visit Information        Provider Department      10/10/2017 2:10 PM Carlita Singh MD [facility]        Today's Diagnoses     Atypical squamous cells of undetermined significance on cytologic smear of cervix (ASC-US)    -  1    Screen for STD (sexually transmitted disease)          Care Instructions    Beba,     1. Make appointment for physical for health questions and preventative medicine  2. STI screen today  3. PAP today  4. Please stop at lab for blood draw    Thank you for allowing me to be a part of your health care team!    Sincerely,   Dr. Singh              Follow-ups after your visit        Who to contact     Please call your clinic at [phone] to:    Ask questions about your health    Make or cancel appointments    Discuss your medicines    Learn about your test results    Speak to your doctor   If you have compliments or concerns about an experience at your clinic, or if you wish to file a complaint, please contact [facility] Patient Relations at [phone] or email us at [email or web address]         Additional Information About Your Visit        MyChart Information     PROSimityt is an electronic gateway that provides easy, online access to your medical records. With ContentDJ, you can request a clinic appointment, read your test results, renew a prescription or communicate with your care team.     To sign up for PROSimityt visit the website at [email or web address]   You will be asked to enter the access code listed below, as well as some personal information. Please follow the directions to create your username and password.     Your access code is: [email or web address]  Expires: 2018  2:37 PM     Your access code will  in 90 days. If you need help or a new code, please contact your  "[facility] or call [phone] for assistance.        Care EveryWhere ID     This is your Care EveryWhere ID. This could be used by other organizations to access your [place] medical records  [id number]        Your Vitals Were     Pulse Temperature Height Last Period BMI (Body Mass Index)       67 98.4  F (36.9  C) (Oral) 5' 4.5\" (163.8 cm) 10/03/2017 34.14 kg/m2        Blood Pressure from Last 3 Encounters:   10/10/17 118/81   07/06/17 130/75   06/08/17 113/74    Weight from Last 3 Encounters:   10/10/17 202 lb (91.6 kg)   07/06/17 201 lb 12.8 oz (91.5 kg)   06/08/17 206 lb (93.4 kg)              We Performed the Following     Chlamydia/Gono Amplified ([facility])     GYN Cytology ([facility])     HIV Ag/Ab Screen [place] ([facility])     Syphilis Screen [place] (RPR/VDRL) ([facility])     Wet Prep ([facility])        Primary Care Provider Office Phone # Fax #    Carlita Ynes Singh [phone]       [facility] [address] [place] [address]        Equal Access to Services     NELDA CINTRON : Michael Ceballos, waaxda pamela, qaybta kaalmada adeanivalda, tucker knutson. So [facility] [phone].    ATENCIÓN: Si habla español, tiene a fine disposición servicios gratuitos de asistencia lingüística. Llame al [phone].    We comply with applicable federal civil rights laws and [place] laws. We do not discriminate on the basis of race, color, national origin, age, disability, sex, sexual orientation, or gender identity.            Thank you!     Thank you for choosing [facility]  for your care. Our goal is always to provide you with excellent care. Hearing back from our patients is one way we can continue to improve our services. Please take a few minutes to complete the written survey that you may receive in the mail after your visit with us. Thank you!             Your Updated Medication List - Protect others " around you: Learn how to safely use, store and throw away your medicines at www.disposemymeds.org.      Notice  As of 10/10/2017  2:38 PM    You have not been prescribed any medications.

## 2017-10-10 NOTE — PROGRESS NOTES
Preceptor attestation:  Patient seen and discussed with the resident.  Assessment and plan reviewed with resident and agreed upon.  Supervising physician: Rani Cabrera MD  [facility]

## 2017-10-11 LAB
C TRACH RRNA CVX QL NAA+PROBE: NEGATIVE
N GONORRHOEA RRNA SPEC QL NAA+PROBE: NEGATIVE
RPR SER QL: NORMAL

## 2017-10-13 LAB
INTERPRETATION: NORMAL
INTERPRETER: NORMAL

## 2017-10-16 ENCOUNTER — RECORDS - HEALTHEAST (OUTPATIENT)
Dept: ADMINISTRATIVE | Facility: OTHER | Age: 47
End: 2017-10-16

## 2017-10-16 LAB
CYTOLOGY CVX/VAG DOC THIN PREP: NORMAL
HIGH RISK?: NO
HPV REFLEX?: NORMAL
LAB AP ABNORMAL BLEEDING: NO
LAB AP BIRTH CONTROL/HORMONES: NORMAL
LAB AP CASE REPORT: NORMAL
LAB AP CERVICAL APPEARANCE: NORMAL
LAB AP GYN OTHER FINDINGS: NORMAL
LAB AP MALIGNANT?: NORMAL
LAB AP PATIENT STATUS: NORMAL
LAB AP PREVIOUS ABNL: NORMAL
LAB AP PREVIOUS NORMAL: NORMAL
LAST MENS PERIOD: NORMAL
SPECIMEN ADEQUACY:: NORMAL

## 2017-10-16 NOTE — PROGRESS NOTES
"S: Beba Rosa is a 47 year old female with a PMH of HSV, chlamydia and abnormal PAP presenting to clinic today with a chief complaint of \"want to get tested and get check out\". She states she would like to get a clean bill of health due to a recent death of her uncle. She also believes her long time male partner may be cheating and wants to get checked out due to concerns for possible STI. She denies vaginal discharge. She reports foul smell since her LMP. LMP: 10/3/2017. She reports history of STI in past.     ROS:  General: No fevers, chills  GI: No nausea, vomiting, constipation, diarrhea  : No urinary pains    Patient Active Problem List   Diagnosis     Health Care Home     Herpes simplex virus (HSV) infection     Illiteracy and low-level literacy     Abnormal Pap smear of cervix     Chlamydia     Lateral epicondylitis of right elbow     Carpal tunnel syndrome of right wrist       No current outpatient prescriptions on file.       O: /81  Pulse 67  Temp 98.4  F (36.9  C) (Oral)  Ht 5' 4.5\" (163.8 cm)  Wt 202 lb (91.6 kg)  LMP 10/03/2017  BMI 34.14 kg/m2   Gen:  Well nourished and in No acute distressy  CV:  RRR  - no murmurs noted   Pulm:  CTAB, no wheezes or crackles noted, good air entry   ABD: soft, nontender, no masses, no rebound, BS intact throughout, no hepatosplenomegaly  : pink cervix without discharge  Psych: Euthymic      Assessment and Plan:  Beba was seen today for gyn exam.    Diagnoses and all orders for this visit:    Atypical squamous cells of undetermined significance on cytologic smear of cervix (ASC-US)  -     GYN Cytology ([facility])  -     HPV Alcova PCR ([facility])    Screen for STD (sexually transmitted disease)  -     HIV Ag/Ab Screen [place] ([facility])  -     Syphilis Screen [place] (RPR/VDRL) ([facility])  -     Wet Prep ([facility])  -     Chlamydia/Gono Amplified ([facility])    Comment: Discussed with patient the need to schedule a formal exam for health " maintenance to address her concerns for overall health and to do a physical exam. Patient is in agreement.     This patient was seen and discussed with Dr. Cabrera who agrees with assessment and plan.    Carlita Singh DO  PGY 2

## 2018-03-05 ENCOUNTER — TELEPHONE (OUTPATIENT)
Dept: FAMILY MEDICINE | Facility: CLINIC | Age: 48
End: 2018-03-05

## 2018-03-05 NOTE — TELEPHONE ENCOUNTER
TAMARA received [facility] paperwork from  to provide clarity on how to complete. Form is requesting estimation of out of pocket expenses inquired from [facility] for the next calendar year. TAMARA called Radha Her whose contact information is listed on the application, she is with [facility] (P: [phone]). She states that this form has typically been filled out in the past by using the past 12 months of expenses. They are look for a close estimate and not exact costs. A separate form was sent to patients pharmacy.     This SW reached out to the [facility] CBO Central Billing Department ([phone]) and left a message with patient's MRN/info and detailed message of what information is requested to complete the form.     TAMARA will await a call back from [facility].       FEDERICA Wilder

## 2018-03-06 NOTE — TELEPHONE ENCOUNTER
Received a message from Marti at [facility] Billing. She states they typically complete the forms in their office and requested it be faxed to her at F:[phone]. Faxed to her on this date. Requested a completed copy be sent to scan to patient's chart. Marti will fax it to the [facility] fax # listed on the top of the form.     SW will keep the original in their files in case it is needed again.

## 2018-04-13 ENCOUNTER — RECORDS - HEALTHEAST (OUTPATIENT)
Dept: ADMINISTRATIVE | Facility: OTHER | Age: 48
End: 2018-04-13

## 2018-04-13 ENCOUNTER — OFFICE VISIT (OUTPATIENT)
Dept: FAMILY MEDICINE | Facility: CLINIC | Age: 48
End: 2018-04-13
Payer: MEDICARE

## 2018-04-13 VITALS
RESPIRATION RATE: 16 BRPM | HEART RATE: 74 BPM | TEMPERATURE: 98.3 F | WEIGHT: 202.6 LBS | HEIGHT: 66 IN | BODY MASS INDEX: 32.56 KG/M2 | SYSTOLIC BLOOD PRESSURE: 118 MMHG | DIASTOLIC BLOOD PRESSURE: 83 MMHG

## 2018-04-13 DIAGNOSIS — N85.2 ENLARGED UTERUS: ICD-10-CM

## 2018-04-13 DIAGNOSIS — L60.9 NAIL COMPLAINT: ICD-10-CM

## 2018-04-13 DIAGNOSIS — N89.8 VAGINAL DISCHARGE: Primary | ICD-10-CM

## 2018-04-13 DIAGNOSIS — B96.89 BV (BACTERIAL VAGINOSIS): ICD-10-CM

## 2018-04-13 DIAGNOSIS — N76.0 BV (BACTERIAL VAGINOSIS): ICD-10-CM

## 2018-04-13 LAB
BACTERIA: NORMAL
CLUE CELLS: NORMAL
MOTILE TRICHOMONAS: NEGATIVE
ODOR: NORMAL
PH WET PREP: NORMAL
WBC WET PREP: NORMAL
YEAST: NORMAL

## 2018-04-13 RX ORDER — METRONIDAZOLE 500 MG/1
500 TABLET ORAL 2 TIMES DAILY
Qty: 14 TABLET | Refills: 0 | Status: SHIPPED | OUTPATIENT
Start: 2018-04-13 | End: 2019-01-24

## 2018-04-13 NOTE — MR AVS SNAPSHOT
After Visit Summary   2018    Beba Rosa    MRN: [id number]           Patient Information     Date Of Birth          1970        Visit Information        Provider Department      2018 10:20 AM Carlita Singh MD [facility]        Today's Diagnoses     Vaginal discharge    -  1    Enlarged uterus        BV (bacterial vaginosis)          Care Instructions    1. Please stop at  to schedule ultrasound to look at your uterus and check if you do have fibroids.   2. Please  prescription for bacterial vaginosis at the pharmacy. This is not an STI. It is a normal imbalance of the bacteria in the vagina which can cause an odor.    Thank you for allowing me to be a part of your health care team!    Sincerely,   Dr. Singh            Follow-ups after your visit        Follow-up notes from your care team     Return if symptoms worsen or fail to improve.      Future tests that were ordered for you today     Open Future Orders        Priority Expected Expires Ordered    US TRANSVAGINAL Routine  2019            Who to contact     Please call your clinic at [phone] to:    Ask questions about your health    Make or cancel appointments    Discuss your medicines    Learn about your test results    Speak to your doctor            Additional Information About Your Visit        MyChart Information     JW Playerhart is an electronic gateway that provides easy, online access to your medical records. With Rocketmiles, you can request a clinic appointment, read your test results, renew a prescription or communicate with your care team.     To sign up for Chatwalat visit the website at [email or web address]   You will be asked to enter the access code listed below, as well as some personal information. Please follow the directions to create your username and password.     Your access code is: [email or web address]  Expires: 2018 11:10 AM     Your access code will  in 90  "days. If you need help or a new code, please contact your [facility] or call [phone] for assistance.        Care EveryWhere ID     This is your Care EveryWhere ID. This could be used by other organizations to access your [place] medical records  [id number]        Your Vitals Were     Pulse Temperature Respirations Height BMI (Body Mass Index)       74 98.3  F (36.8  C) 16 5' 5.5\" (166.4 cm) 33.2 kg/m2        Blood Pressure from Last 3 Encounters:   04/13/18 118/83   10/10/17 118/81   07/06/17 130/75    Weight from Last 3 Encounters:   04/13/18 202 lb 9.6 oz (91.9 kg)   10/10/17 202 lb (91.6 kg)   07/06/17 201 lb 12.8 oz (91.5 kg)              We Performed the Following     Wet Prep ([facility])          Today's Medication Changes          These changes are accurate as of 4/13/18 11:10 AM.  If you have any questions, ask your nurse or doctor.               Start taking these medicines.        Dose/Directions    metroNIDAZOLE 500 MG tablet   Commonly known as:  FLAGYL   Used for:  BV (bacterial vaginosis)   Started by:  Carlita Singh MD        Dose:  500 mg   Take 1 tablet (500 mg) by mouth 2 times daily   Quantity:  14 tablet   Refills:  0            Where to get your medicines      These medications were sent to [facility] - [place], [place] - [address], [place] [address]     Phone:  [phone]     metroNIDAZOLE 500 MG tablet                Primary Care Provider Office Phone # Fax #    Carlita Singh [phone]       [facility] [address] [place] [address]        Equal Access to Services     NELDA CINTRON AH: Michael Ceballos, wabjda luqadaha, qaybta kaalmada ayeyasebastian, tucker knutson. So [facility] [phone].    ATENCIÓN: Si habla español, tiene a fine disposición servicios gratuitos de asistencia lingüística. Llame al [phone].    We comply with applicable " federal civil rights laws and [place] laws. We do not discriminate on the basis of race, color, national origin, age, disability, sex, sexual orientation, or gender identity.            Thank you!     Thank you for choosing [facility]  for your care. Our goal is always to provide you with excellent care. Hearing back from our patients is one way we can continue to improve our services. Please take a few minutes to complete the written survey that you may receive in the mail after your visit with us. Thank you!             Your Updated Medication List - Protect others around you: Learn how to safely use, store and throw away your medicines at www.disposemymeds.org.          This list is accurate as of 4/13/18 11:10 AM.  Always use your most recent med list.                   Brand Name Dispense Instructions for use Diagnosis    metroNIDAZOLE 500 MG tablet    FLAGYL    14 tablet    Take 1 tablet (500 mg) by mouth 2 times daily    BV (bacterial vaginosis)

## 2018-04-13 NOTE — PATIENT INSTRUCTIONS
1. Please stop at  to schedule ultrasound to look at your uterus and check if you do have fibroids.   2. Please  prescription for bacterial vaginosis at the pharmacy. This is not an STI. It is a normal imbalance of the bacteria in the vagina which can cause an odor.    Thank you for allowing me to be a part of your health care team!    Sincerely,   Dr. Singh    TRANSVAGINAL ULTRASOUND:  [facility]  Radiology Department 1st floor  [address] [address]  [phone]  Date:   Monday April 16, 2018  Time:   12:30 PM  Please have a full bladder by drinking 32 ounces of water and refrain from using the bathroom 1 hour prior to appointment.  Given to patient.  Conchis Arroyo  4/13/18

## 2018-04-13 NOTE — PROGRESS NOTES
Preceptor Attestation:   Patient seen, evaluated and discussed with the resident. I have verified the content of the note, which accurately reflects my assessment of the patient and the plan of care.   Supervising Physician:  Graham Calvert MD

## 2018-04-13 NOTE — PROGRESS NOTES
"S: Beba Rosa is a 47 year old female with a PMH of   Patient Active Problem List   Diagnosis     Health Care Home     Herpes simplex virus (HSV) infection     Illiteracy and low-level literacy     Abnormal Pap smear of cervix     Chlamydia     Lateral epicondylitis of right elbow     Carpal tunnel syndrome of right wrist     presenting to clinic today with a chief complaint of \"stripes in finger nails\"  Which she has always had however her patient said she should have her protein checked and something in her uterus noticed by her partner yesterday. She denies vaginal bleeding outside of her normal menses last 4/4/2018. She notices a fishy smell since yesterday. She denies new sexual partners. Monogamous relationship. She has had STI in the past from her partner. She denies having a history of BV. She does not douche. Unknown when her mother went through menapause.       ROS:  General: No fevers, chills. Reports hot flashes.   GI: No nausea, vomiting, constipation, diarrhea. No abdominal pain.   : No urinary pains, no dysuria.     Current Outpatient Prescriptions   Medication Sig Dispense Refill     metroNIDAZOLE (FLAGYL) 500 MG tablet Take 1 tablet (500 mg) by mouth 2 times daily 14 tablet 0       O: /83  Pulse 74  Temp 98.3  F (36.8  C)  Resp 16  Ht 5' 5.5\" (1.664 m)  Wt 202 lb 9.6 oz (91.9 kg)  BMI 33.2 kg/m2   Gen:  Well nourished and in No acute distress   CV:  RRR  - no murmurs noted   Pulm:  CTAB, no wheezes or crackles noted, good air entry   ABD: soft, nontender, no masses, no rebound, BS intact throughout, no hepatosplenomegaly  : normal external female genitalia, pink gravid cervical os with clear drainage. Vaginal vault without mass. Palpable uterus by bimanual exam approximately 12 weeks size.   Psych: Euthymic   SKin: Patient with multiple fingernails with vertical stripes of mildly darker pigment compared to rest of nail.  No dark lesions or nail changes.     Assessment and " Plan:  Beba was seen today for medication request, vaginal problem and fingernails.    Diagnoses and all orders for this visit:    Vaginal discharge  -     Wet Prep ([facility])    Enlarged uterus  -     US TRANSVAGINAL; Future  Comment: Physical exam findings of enlarged uterus is unlikely related to pregnancy as patient has had tubal ligation and therefore highly likely to have pregnancy.  Patient also just had menstrual cycle 1 week prior which she describes as having been normal.  Due to concern for enlarged uterus and possible fibroids patient will follow up with transvaginal ultrasound.    BV (bacterial vaginosis)  -     metroNIDAZOLE (FLAGYL) 500 MG tablet; Take 1 tablet (500 mg) by mouth 2 times daily  Comment: Patient will be sent with antibiotics as per blood prep results.    Nail Complaint: Advised patient that the nail concerns she has is unlikely anything that is abnormal as patient has had these for as long as she can remember.  She reported no changes to her nailbeds or discoloration of her nails reports they have always looked like this.  Is unlikely that patient has a nutrient deficiency as she appears well-nourished and not appearing underweight.  Advised patient that if she would have no changes of dark coloration or nailbed changes this could be a sign of underlying cancer.  Patient reported understanding      This patient was seen and discussed with Dr. Calvert.    Carlita Singh DO  PGY 2

## 2018-04-16 ENCOUNTER — HOSPITAL ENCOUNTER (OUTPATIENT)
Dept: ULTRASOUND IMAGING | Facility: CLINIC | Age: 48
Discharge: HOME | End: 2018-04-16

## 2018-04-16 DIAGNOSIS — N85.2 ENLARGED UTERUS: ICD-10-CM

## 2018-04-23 ENCOUNTER — OFFICE VISIT (OUTPATIENT)
Dept: FAMILY MEDICINE | Facility: CLINIC | Age: 48
End: 2018-04-23
Payer: MEDICARE

## 2018-04-23 VITALS
DIASTOLIC BLOOD PRESSURE: 71 MMHG | SYSTOLIC BLOOD PRESSURE: 104 MMHG | BODY MASS INDEX: 32.61 KG/M2 | OXYGEN SATURATION: 100 % | HEART RATE: 78 BPM | TEMPERATURE: 98.7 F | WEIGHT: 199 LBS

## 2018-04-23 DIAGNOSIS — R19.7 DIARRHEA, UNSPECIFIED TYPE: Primary | ICD-10-CM

## 2018-04-23 DIAGNOSIS — N85.2 ENLARGED UTERUS: ICD-10-CM

## 2018-04-23 NOTE — PROGRESS NOTES
Preceptor Attestation:   Patient seen, evaluated and discussed with the resident. I have verified the content of the note, which accurately reflects my assessment of the patient and the plan of care.   Supervising Physician:  Blanco Marina MD

## 2018-04-23 NOTE — PROGRESS NOTES
S: Beba Rosa is a 47 year old female with a PMH of   Patient Active Problem List   Diagnosis     Health Care Home     Herpes simplex virus (HSV) infection     Illiteracy and low-level literacy     Abnormal Pap smear of cervix     Chlamydia     Lateral epicondylitis of right elbow     Carpal tunnel syndrome of right wrist     Enlarged uterus-Normal US 4/2018     presenting to clinic today with a chief complaint of diarrhea after the medicine. Stomach ache with metronidazole. No smell. No vaginal discharge.       ROS:  General: No fevers, chills  GI: No nausea, vomiting  : No urinary pains    Current Outpatient Prescriptions   Medication Sig Dispense Refill     metroNIDAZOLE (FLAGYL) 500 MG tablet Take 1 tablet (500 mg) by mouth 2 times daily (Patient not taking: Reported on 4/23/2018) 14 tablet 0       O: /71  Pulse 78  Temp 98.7  F (37.1  C) (Oral)  Wt 199 lb (90.3 kg)  LMP 04/11/2018 (Approximate)  SpO2 100%  Breastfeeding? No  BMI 32.61 kg/m2   Gen:  Well nourished and in No acute distress   ABD: soft, nontender, no masses, no rebound, BS intact throughout, no hepatosplenomegaly  Psych: Euthymic      Assessment and Plan:  Beba was seen today for recheck and results.    Diagnoses and all orders for this visit:    Diarrhea, unspecified type    Enlarged uterus    Comment: patient had side effect from metronidazole of diarrhea and therefore quit her medication. No symptoms of BV at this time. She wanted reassurance for her partner that this was not an STI. Discussed and printed in AVS. Discussed normal results of U/S of uterus. Follow up PRN. Will not retest for BV due to lack of symptoms.      This patient was seen and discussed with Dr. Branden Singh DO  PGY 2

## 2018-04-23 NOTE — MR AVS SNAPSHOT
After Visit Summary   2018    Beba Rosa    MRN: [id number]           Patient Information     Date Of Birth          1970        Visit Information        Provider Department      2018 1:30 PM Carlita Singh MD [facility]        Care Instructions    1. Follow up as needed  2. Your ultrasound was normal but you have a big uterus that looks normal!  3. Bacterial vaginosis is an imbalance of your normal vaginal bacteria. This is not an infection and not an STD    Thank you for allowing me to be a part of your health care team!    Sincerely,   Dr. Singh            Follow-ups after your visit        Who to contact     Please call your clinic at [phone] to:    Ask questions about your health    Make or cancel appointments    Discuss your medicines    Learn about your test results    Speak to your doctor            Additional Information About Your Visit        MyChart Information     Munch On Me is an electronic gateway that provides easy, online access to your medical records. With Munch On Me, you can request a clinic appointment, read your test results, renew a prescription or communicate with your care team.     To sign up for IndiaMARTt visit the website at [email or web address]   You will be asked to enter the access code listed below, as well as some personal information. Please follow the directions to create your username and password.     Your access code is: [email or web address]  Expires: 2018 11:10 AM     Your access code will  in 90 days. If you need help or a new code, please contact your [facility] or call [phone] for assistance.        Care EveryWhere ID     This is your Care EveryWhere ID. This could be used by other organizations to access your [place] medical records  [id number]        Your Vitals Were     Pulse Temperature Last Period Pulse Oximetry Breastfeeding? BMI (Body Mass Index)    78 98.7  F (37.1  C) (Oral)  04/11/2018 (Approximate) 100% No 32.61 kg/m2       Blood Pressure from Last 3 Encounters:   04/23/18 104/71   04/13/18 118/83   10/10/17 118/81    Weight from Last 3 Encounters:   04/23/18 199 lb (90.3 kg)   04/13/18 202 lb 9.6 oz (91.9 kg)   10/10/17 202 lb (91.6 kg)              Today, you had the following     No orders found for display       Primary Care Provider Office Phone # Fax #    Carlita Ynes Singh [phone]       [facility] [address] [place] [address]        Equal Access to Services     NELDA CINTRON : Michael Ceballos, joanna santiago, ramirez acosta, tucker knutson. So [facility] [phone].    ATENCIÓN: Si habla español, tiene a fine disposición servicios gratuitos de asistencia lingüística. Llame al [phone].    We comply with applicable federal civil rights laws and [place] laws. We do not discriminate on the basis of race, color, national origin, age, disability, sex, sexual orientation, or gender identity.            Thank you!     Thank you for choosing [facility]  for your care. Our goal is always to provide you with excellent care. Hearing back from our patients is one way we can continue to improve our services. Please take a few minutes to complete the written survey that you may receive in the mail after your visit with us. Thank you!             Your Updated Medication List - Protect others around you: Learn how to safely use, store and throw away your medicines at www.disposemymeds.org.          This list is accurate as of 4/23/18  1:54 PM.  Always use your most recent med list.                   Brand Name Dispense Instructions for use Diagnosis    metroNIDAZOLE 500 MG tablet    FLAGYL    14 tablet    Take 1 tablet (500 mg) by mouth 2 times daily    BV (bacterial vaginosis)

## 2018-04-23 NOTE — PATIENT INSTRUCTIONS
1. Follow up as needed  2. Your ultrasound was normal but you have a big uterus that looks normal!  3. Bacterial vaginosis is an imbalance of your normal vaginal bacteria. This is not an infection and not an STD    Thank you for allowing me to be a part of your health care team!    Sincerely,   Dr. Singh

## 2018-04-24 NOTE — PROGRESS NOTES
Preceptor Attestation:   Patient seen, evaluated and discussed with the resident. I have verified the content of the note, which accurately reflects my assessment of the patient and the plan of care.   Supervising Physician:  Jameel Otero MD

## 2018-08-09 DIAGNOSIS — N85.2 ENLARGED UTERUS: ICD-10-CM

## 2018-08-20 NOTE — PROGRESS NOTES
Called and discussed normal results with patient. Plan to follow up as needed.     Carlita Singh, DO  PGY3

## 2019-01-24 ENCOUNTER — OFFICE VISIT (OUTPATIENT)
Dept: FAMILY MEDICINE | Facility: CLINIC | Age: 49
End: 2019-01-24
Payer: MEDICARE

## 2019-01-24 ENCOUNTER — RECORDS - HEALTHEAST (OUTPATIENT)
Dept: ADMINISTRATIVE | Facility: OTHER | Age: 49
End: 2019-01-24

## 2019-01-24 VITALS
RESPIRATION RATE: 16 BRPM | WEIGHT: 207.4 LBS | TEMPERATURE: 97.9 F | SYSTOLIC BLOOD PRESSURE: 129 MMHG | HEART RATE: 80 BPM | DIASTOLIC BLOOD PRESSURE: 87 MMHG | BODY MASS INDEX: 33.99 KG/M2 | OXYGEN SATURATION: 98 %

## 2019-01-24 DIAGNOSIS — F12.11 CANNABIS USE DISORDER, MILD, IN EARLY REMISSION: ICD-10-CM

## 2019-01-24 DIAGNOSIS — G50.0 TRIGEMINAL NEURALGIA: Primary | ICD-10-CM

## 2019-01-24 NOTE — PATIENT INSTRUCTIONS
MR Brain w/o & w Contrast  [facility]  [phone]  Fax Orders to [phone]    [facility]     [address] [address]    Appointment:  2019  Arrival Time:  6:45 am    Please bring a copy of your insurance card and photo ID    If you cannot make this appointment please call [phone] to reschedule    2019 at 10:48 Order faxed to [facility] Scheduling at [phone]. [facility]    OPHTHALMOLOGY ADULT REFERRAL  [facility]           [address] [place] [id number]    Appointment:  2019  Arrival Time:  10:15 am  Provider:  Dr. Blankenship      Please bring a copy of your insurance card and photo ID    Your appointment will be between 90 minutes to 2 hours long    Your eyes will be dilated     If you wear contacts or glasses please bring these with you to your appointment     If you cannot make this appointment, please contact [phone] to reschedule    2019 at 10:56 am printed out appointment details given to patient - verified no additional questions or concerns at this time. [facility]

## 2019-01-31 ENCOUNTER — HOSPITAL ENCOUNTER (OUTPATIENT)
Dept: MRI IMAGING | Facility: CLINIC | Age: 49
Discharge: HOME | End: 2019-01-31
Attending: FAMILY MEDICINE

## 2019-01-31 DIAGNOSIS — G50.0 TRIGEMINAL NEURALGIA: ICD-10-CM

## 2019-02-09 ENCOUNTER — TRANSFERRED RECORDS (OUTPATIENT)
Dept: HEALTH INFORMATION MANAGEMENT | Facility: CLINIC | Age: 49
End: 2019-02-09

## 2019-02-11 DIAGNOSIS — G50.0 TRIGEMINAL NEURALGIA: ICD-10-CM

## 2019-02-20 NOTE — RESULT ENCOUNTER NOTE
Called patient about result. Her pain has resolved.  Discussed that MRI is normal. Come back if pain returns and would consider carbamazepine to control pain.

## 2019-04-22 NOTE — PROGRESS NOTES
"S: Beba Rosa is a 48 year old female with a PMH of   Patient Active Problem List   Diagnosis     Health Care Home     Herpes simplex virus (HSV) infection     Illiteracy and low-level literacy     Abnormal Pap smear of cervix     Chlamydia     Lateral epicondylitis of right elbow     Carpal tunnel syndrome of right wrist     Enlarged uterus-Normal US 4/2018     presenting to clinic today with a chief complaint of lost disability support from [place]. Patient reports she has issues with carpal tunnel and this may impede her ability to work. She reports prior papers had been done in [place] and the \"files were lost\". Patient is concerned about duration of time able to work due to carpal tunnel and challenges with literacy difficulties.       No current outpatient medications on file.       O: /83 (BP Location: Left arm, Patient Position: Sitting, Cuff Size: Adult Large)   Pulse 69   Temp 98.3  F (36.8  C) (Oral)   Resp 16   Wt 101.1 kg (222 lb 12.8 oz)   SpO2 100%   BMI 36.51 kg/m     Gen:  Well nourished and in No acute distress   Extremities: not wearing wrist splints.  Psych: Euthymic      Assessment and Plan:  Beba was seen today for forms and medication reconciliation.    Diagnoses and all orders for this visit:    Carpal tunnel syndrome of right wrist    Illiteracy and low-level literacy    Workability form filled out for patient. Copy provided to patient and another to be scanned into the chart. This was completed with patient regarding history of carpal tunnel and low level literacy. Patient Believes she could work 4 hours about 5 days per week with the limitation of no repetitive movements due to carpal tunnel. This seems reasonable. Patient will follow up as needed.     This patient was seen and discussed with Dr. Quintanilla who agrees with the assessment and plan.     Carlita Singh, DO  PGY3    "

## 2019-04-23 ENCOUNTER — OFFICE VISIT (OUTPATIENT)
Dept: FAMILY MEDICINE | Facility: CLINIC | Age: 49
End: 2019-04-23
Payer: MEDICARE

## 2019-04-23 VITALS
DIASTOLIC BLOOD PRESSURE: 83 MMHG | OXYGEN SATURATION: 100 % | RESPIRATION RATE: 16 BRPM | SYSTOLIC BLOOD PRESSURE: 120 MMHG | WEIGHT: 222.8 LBS | HEART RATE: 69 BPM | TEMPERATURE: 98.3 F | BODY MASS INDEX: 36.51 KG/M2

## 2019-04-23 DIAGNOSIS — Z55.0 ILLITERACY AND LOW-LEVEL LITERACY: ICD-10-CM

## 2019-04-23 DIAGNOSIS — G56.01 CARPAL TUNNEL SYNDROME OF RIGHT WRIST: Primary | ICD-10-CM

## 2019-04-23 SDOH — EDUCATIONAL SECURITY - EDUCATION ATTAINMENT: ILITERACY AND LOW LEVEL LITERACY: Z55.0

## 2019-04-23 NOTE — PROGRESS NOTES
Preceptor Attestation:   Patient seen, evaluated and discussed with the resident. I have verified the content of the note, which accurately reflects my assessment of the patient and the plan of care.   Supervising Physician:  Dmitriy Quintanilla MD

## 2019-09-06 ENCOUNTER — OFFICE VISIT (OUTPATIENT)
Dept: FAMILY MEDICINE | Facility: CLINIC | Age: 49
End: 2019-09-06
Payer: MEDICARE

## 2019-09-06 VITALS
OXYGEN SATURATION: 99 % | RESPIRATION RATE: 16 BRPM | WEIGHT: 213.2 LBS | BODY MASS INDEX: 34.94 KG/M2 | SYSTOLIC BLOOD PRESSURE: 118 MMHG | DIASTOLIC BLOOD PRESSURE: 84 MMHG | TEMPERATURE: 98.5 F | HEART RATE: 68 BPM

## 2019-09-06 DIAGNOSIS — Z55.0 ILLITERACY AND LOW-LEVEL LITERACY: ICD-10-CM

## 2019-09-06 DIAGNOSIS — Z02.89 ENCOUNTER FOR COMPLETION OF FORM WITH PATIENT: Primary | ICD-10-CM

## 2019-09-06 DIAGNOSIS — L73.1 INGROWN HAIR: ICD-10-CM

## 2019-09-06 DIAGNOSIS — G56.01 CARPAL TUNNEL SYNDROME OF RIGHT WRIST: ICD-10-CM

## 2019-09-06 RX ORDER — CEPHALEXIN 500 MG/1
500 CAPSULE ORAL 2 TIMES DAILY
Qty: 14 CAPSULE | Refills: 0 | Status: SHIPPED | OUTPATIENT
Start: 2019-09-06 | End: 2019-09-13

## 2019-09-06 RX ORDER — IBUPROFEN 200 MG
200 TABLET ORAL EVERY 4 HOURS PRN
Qty: 60 TABLET | Refills: 1 | Status: SHIPPED | OUTPATIENT
Start: 2019-09-06 | End: 2021-03-16

## 2019-09-06 SDOH — EDUCATIONAL SECURITY - EDUCATION ATTAINMENT: ILITERACY AND LOW LEVEL LITERACY: Z55.0

## 2019-09-06 ASSESSMENT — PATIENT HEALTH QUESTIONNAIRE - PHQ9: SUM OF ALL RESPONSES TO PHQ QUESTIONS 1-9: 9

## 2019-09-06 NOTE — PROGRESS NOTES
"       SUBJECTIVE       Beba Rosa is a 49 year old  female with a PMH significant for:     Patient Active Problem List   Diagnosis     Health Care Home     Herpes simplex virus (HSV) infection     Illiteracy and low-level literacy     Abnormal Pap smear of cervix     Chlamydia     Lateral epicondylitis of right elbow     Carpal tunnel syndrome of right wrist     Enlarged uterus-Normal US 4/2018     She presents with a medical opinion form and bump on vaginal area.    Form: [facility] asking for a medical opinion form due to her disability. She is undergoing appeal process and has an . She has carpal tunnel in both hands and a learning disability. She has a hard time reading and knowing words. She has had the carpal tunnel release, which helped a little, but now the pain has returned. She has cramping feeling in her hands while doing household tasks and has to shake her hands out. She wakes up at night with pain. She has a hard time sleeping. She has a feeling of someone \"sticking her\" in her feet and legs. It's been going for about 2 days and then it went away. 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 She left school in the 11th grade due to pregnancy. She thinks she was in special education courses. She said she's had neuropsychiatric testing in 2009 or 2010 at [place]. EMG in 4/18/13 for carpal tunnel at neurological [facility]. She uses splints at night, but needs new ones. She would like ibuprofen/acetaminophen for the pain.    Bump: has had since yesterday. It was painful and she tried to pop it but it hurt too much. It's red and swollen. It's warm. It has a sharp stinging pain. She's had an HSV infection before. It's about the size of a pea. She says it doesn't look like herpes or vesicular, which she's had before.    PMH, Medications and Allergies were reviewed and updated as needed.        REVIEW OF SYSTEMS     See HPI.        OBJECTIVE     Vitals: "    09/06/19 0951   BP: 118/84   Pulse: 68   Resp: 16   Temp: 98.5  F (36.9  C)   TempSrc: Oral   SpO2: 99%   Weight: 96.7 kg (213 lb 3.2 oz)     Body mass index is 34.94 kg/m .    Constitutional: Awake, alert, cooperative, no apparent distress, and appears stated age.  Lungs: No increased work of breathing.  Genitourinary: No urethral discharge, normal external genitalia, no hernia. Slight smaller than pea-sized skin toned raised tender bump on outer labia near anterior most part of labia. Not red.   Neuropsychiatric: Normal affect, mood, stutters on occasion and appears to process slowly.  Skin: No rashes, raised lesion on vulva as above.    No results found for this or any previous visit (from the past 24 hour(s)).        ASSESSMENT AND PLAN     Beba was seen today for forms and vaginal problem.    Diagnoses and all orders for this visit:    Encounter for completion of form with patient: here to have medical opinion form filled out. States she can work for about 2 hours before her wrists hurt. She says her disability is due to her carpal tunnel and learning disability. She's had surgery on her wrists and this did not help her. She also thinks she was in special education growing up. Given that this impacts her ability to work would like her to undergo a more recent neuropsychologic testing.    Carpal tunnel syndrome of right wrist: needed new braces and instructed to wear at night. She also would like ibuprofen for her wrist pain. Will continue to follow carpal tunnel at her next visit.  -     BICRO WRIST BRACE BILATERAL M  -     ibuprofen (ADVIL/MOTRIN) 200 MG tablet; Take 1 tablet (200 mg) by mouth every 4 hours as needed for mild pain or moderate pain    Illiteracy and low-level literacy: unclear underlying disorder/diagnosis. Appears to have been in special education and possible dyslexia. Would also like to know if there is other work she could do.  -     NEUROPSYCHOLOGY REFERRAL    Ingrown hair: does not  appear to be cellulitic. Discussed doing warm compresses given she doesn't have a bath tub. Will try a week of antibiotics to see if this helps.   -     cephALEXin (KEFLEX) 500 MG capsule; Take 1 capsule (500 mg) by mouth 2 times daily for 7 days      RTC in 6-8 weeks for follow up of carpal tunnel and neuropsychology testing or sooner if develops new or worsening symptoms.    Leola Last MD PGY2  I precepted today with Roni Hunter MD.

## 2019-09-06 NOTE — PATIENT INSTRUCTIONS
Try warm compresses on your sore a couple times per day.    Faxed Referral over to the Neuropsychology at the [facility], they will contact the patient to schedule. TIMOTHY Maloney

## 2019-09-06 NOTE — PROGRESS NOTES
Preceptor Attestation:   Patient seen, evaluated and discussed with the resident. I have verified the content of the note, which accurately reflects my assessment of the patient and the plan of care.   Supervising Physician:  Roni Hunter MD MD

## 2019-10-09 ENCOUNTER — DOCUMENTATION ONLY (OUTPATIENT)
Dept: FAMILY MEDICINE | Facility: CLINIC | Age: 49
End: 2019-10-09

## 2019-10-09 NOTE — PROGRESS NOTES
I have reviewed and agree with the behavioral health fellow's summary and recommendations.  I wonder if we can ever get information related to past assessments from social security disability if she was approved for benefits based on her learning problems in the past.  She has Medicare so must have been approved at some point.  Perhaps they are doing a re-assessment at this time?  Might be a question for Tangela so would consider a [facility] referral for some additional help on what the problem is, what testing would help with, etc.  I also wonder if a program that addresses adult education may have some helpful resources/ideas for us.    [facility] [place] [employer]  [address] [place], [address]  [phone]  Radha Villarreal, PhD., LP

## 2019-10-09 NOTE — PROGRESS NOTES
To be completed in Nursing note:    Please reference list for forms that require a visit for completion.  Please remind patients that providers are given 3-5 business days to complete and return forms.      Form type:Disability Determination    Date form received:10/1/19    Date form completed by Physician:    How was form returned to patient (mailed, faxed, or at  for patient to ):    Date form mailed/faxed/left at  for patient and sent to HIM for scanning:      Once form is left for patient, faxed, or mailed PCS will then close the documentation only encounter.

## 2019-10-09 NOTE — PROGRESS NOTES
"Review of Dr. Last's order and note indicates that this is a referral for further assessment/diagnostic clarity related to low literacy and learning problems. Per note, patient has a long history of what patient describes as a \"learning disorder\" that has impacted her in educational and vocational settings. In addition, patient reported getting \"neuropsychiatric\" testing in 2009 or 2010 at [place] but unfortunately I cannot see those records in Epic. Does not appear to have any formal mental health diagnoses though per a note from Dr. Merino in January 2019, patient was using cannabis to cope with \"anxiety and stress.\"     Unfortunately most [facility] clinics do not evaluate adults for learning disorder concerns. Further, it is often difficult to get a learning disorder evaluation outside of an ADHD evaluation covered by insurance for adults.  I am hoping that Dr. Last can weigh in on patient's desire/purpose of getting this diagnostic clarity related to learning. For example, are we hoping that this diagnosis will help patient qualify for needed services, help her better understand vocational capabilities, etc.?     I am not sure if we are able to request [place] records from 10 years ago, but this is something to investigate if she indeed got testing done then.     Will ask Dr. Villarreal to weigh in on any other thoughts also.     Neha Poe, PhD  Behavioral Health Fellow    Disclaimer  The above treatment recommendations are based on consultation with the patient's primary care provider and a review of relevant information in EPIC.? I have not personally examined the patient.? All recommendations should be implemented with considerations of the patient's relevant prior history and current clinical status.  Please contact me with any questions about the care of this patient.    "

## 2019-10-18 ENCOUNTER — OFFICE VISIT (OUTPATIENT)
Dept: FAMILY MEDICINE | Facility: CLINIC | Age: 49
End: 2019-10-18
Payer: MEDICARE

## 2019-10-18 VITALS
SYSTOLIC BLOOD PRESSURE: 114 MMHG | DIASTOLIC BLOOD PRESSURE: 79 MMHG | RESPIRATION RATE: 16 BRPM | WEIGHT: 220.4 LBS | OXYGEN SATURATION: 100 % | TEMPERATURE: 97.9 F | HEIGHT: 65 IN | BODY MASS INDEX: 36.72 KG/M2 | HEART RATE: 72 BPM

## 2019-10-18 DIAGNOSIS — R25.2 CRAMPS OF LEFT LOWER EXTREMITY: ICD-10-CM

## 2019-10-18 DIAGNOSIS — Z55.0 ILLITERACY AND LOW-LEVEL LITERACY: Primary | ICD-10-CM

## 2019-10-18 DIAGNOSIS — Z00.00 HEALTHCARE MAINTENANCE: ICD-10-CM

## 2019-10-18 RX ORDER — BACLOFEN 10 MG/1
5 TABLET ORAL 3 TIMES DAILY
Qty: 20 TABLET | Refills: 0 | Status: SHIPPED | OUTPATIENT
Start: 2019-10-18 | End: 2021-03-16

## 2019-10-18 SDOH — EDUCATIONAL SECURITY - EDUCATION ATTAINMENT: ILITERACY AND LOW LEVEL LITERACY: Z55.0

## 2019-10-18 ASSESSMENT — MIFFLIN-ST. JEOR: SCORE: 1635.61

## 2019-10-18 NOTE — PROGRESS NOTES
SUBJECTIVE       Beba Rosa is a 49 year old  female with a PMH significant for:     Patient Active Problem List   Diagnosis     Health Care Home     Herpes simplex virus (HSV) infection     Illiteracy and low-level literacy     Abnormal Pap smear of cervix     Chlamydia     Lateral epicondylitis of right elbow     Carpal tunnel syndrome of right wrist     Enlarged uterus-Normal US 4/2018     She presents for follow up of medical opinion forms.    The psychological testing is for SSI/disability. She had disability in the past and then it was cut off. She is working with  and she's been waiting for information from them, but hasn't heard anything. She is going to see the folks for a reassessment on October 25th at [address] [place] [address]. She did not bring the papers in with her today with the information. She's been cut off since February. She has been working with [employer] [facility] [employer]. She discussed her situation with Tangela, the  that works here from [facility], and was able to clarify that she is not sure the reason she was cut off from her disability funding in the past.     She's been having pains in her legs. She has been using icy hot. She's had a muscle relaxer in the past which helped. She thinks it was the baclofen and it was very helpful with the pain and it was very helpful. This hasn't been occurring too much, but has been occurring more frequently. This has been going on for a week. She'd really like the try the muscle relaxer on top of it. She denies fevers, chills, diarrhea, vomiting, nausea. She reports cramping from her period. She doesn't feel ill. She just has cramps in her arms and legs. LMP was 10/14/2019. She has ibuprofen and she doesn't take it. She used it for a headache and it didn't take the headache away. She doesn't like ibuprofen for pain generally.    PMH, Medications and Allergies were reviewed and updated as needed.        REVIEW OF SYSTEMS     See HPI.         "OBJECTIVE     Vitals:    10/18/19 1341   BP: 114/79   BP Location: Left arm   Patient Position: Sitting   Cuff Size: Adult Large   Pulse: 72   Resp: 16   Temp: 97.9  F (36.6  C)   TempSrc: Oral   SpO2: 100%   Weight: 100 kg (220 lb 6.4 oz)   Height: 1.651 m (5' 5\")     Body mass index is 36.68 kg/m .    Constitutional: Awake, alert, cooperative, no apparent distress, and appears stated age.  Eyes: Lids and lashes normal.  ENT: Normocephalic.  Neck: Symmetric.  Lungs: No increased work of breathing  Musculoskeletal: No tenderness to palpation of the quads bilaterally. Full range of motion noted in lower extremities.  Motor strength is 5 out of 5 lower extremities bilaterally at hips and knees.  Tone is normal.  Neurologic: Gait is normal.  Neuropsychiatric: Normal affect, mood, orientation, memory and insight.    No results found for this or any previous visit (from the past 24 hour(s)).      ASSESSMENT AND PLAN     Beba was seen today for recheck medication, pain and medication request.    Diagnoses and all orders for this visit:    Illiteracy and low-level literacy: patient gave verbal consent to discuss case with Tangela and had Tangela chat with patient. Current plan is to connect with   and see if there is additional medical paperwork needed for Beba at this point. We will follow up on this at her next visit.    Cramps of left lower extremity: Icy hot has improved the pain, but she would like further relief with baclofen, which she has used in the past. It is unclear what is causing the pain, but it does not appear to be neurologic in nature as she has intact reflexes and strength of her lower extremities. She is able to walk. She is not taking any medications that can cause an electrolyte disturbance leading to cramping. Will see how a trial of Icy Hot with baclofen treats the pain.  -     baclofen (LIORESAL) 10 MG tablet; Take 0.5 tablets (5 mg) by mouth 3 times daily    Healthcare " maintenance: discussed returning to clinic for an annual physical examination. Discussed that she does not need a pap smear until October 2020.  -     calcium carbonate-vitamin D (OS-BENITO) 600-400 MG-UNIT chewable tablet; Take 1 chew tab by mouth 2 times daily      RTC in 2-3 months for follow up of disability assessment and physical examination or sooner if develops new or worsening symptoms.    Leola Last MD  I precepted today with Kayden Jarvis MD.

## 2019-10-18 NOTE — PROGRESS NOTES
Preceptor Attestation:   Patient seen, evaluated and discussed with the resident. I have verified the content of the note, which accurately reflects my assessment of the patient and the plan of care.   Supervising Physician:  Kayden Jarvis MD.

## 2019-11-24 DIAGNOSIS — Z13.220 LIPID SCREENING: Primary | ICD-10-CM

## 2020-04-03 ENCOUNTER — TELEPHONE (OUTPATIENT)
Dept: FAMILY MEDICINE | Facility: CLINIC | Age: 50
End: 2020-04-03

## 2020-04-03 NOTE — TELEPHONE ENCOUNTER
Reached out to patient during COVID19 Clinic outreach. Reassured patient that [facility] is still open and has started implementing phone and video appointments to help patient remain safe at home.     Patient reports the following concerns: doing alright, would like yearly visit    No other concerns right now. Patient knows she can schedule telephone visit.    Leola Last MD

## 2020-04-27 ENCOUNTER — VIRTUAL VISIT (OUTPATIENT)
Dept: FAMILY MEDICINE | Facility: CLINIC | Age: 50
End: 2020-04-27
Payer: MEDICARE

## 2020-04-27 VITALS — BODY MASS INDEX: 37.44 KG/M2 | WEIGHT: 225 LBS

## 2020-04-27 DIAGNOSIS — M79.604 RIGHT LEG PAIN: Primary | ICD-10-CM

## 2020-04-27 RX ORDER — COVID-19 ANTIGEN TEST
220 KIT MISCELLANEOUS 2 TIMES DAILY WITH MEALS
COMMUNITY
End: 2021-03-16

## 2020-04-27 NOTE — PROGRESS NOTES
"Family Medicine Telephone Visit Note               Telephone Visit Consent   Patient was verbally read the following and verbal consent was obtained.    \"This telephone visit will be conducted via a call between you and your physician/provider. We have found that certain health care needs can be provided without the need for a physical exam.  This service lets us provide the care you need with a short phone conversation.  If a prescription is necessary we can send it directly to your pharmacy.  If lab work is needed we can place an order for that and you can then stop by our lab to have the test done at a later time.    Telephone visits are billed at different rates depending on your insurance coverage. During this emergency period, for some insurers they may be billed the same as an in-person visit.  Please reach out to your insurance provider with any questions.    If during the course of the call the physician/provider feels a telephone visit is not appropriate, you will not be charged for this service.\"    Name person giving consent:  Patient   Date verbal consent given:  4/27/2020  Time verbal consent given:  10:20 AM           Chief Complaint   Patient presents with     Musculoskeletal Problem     right leg pain, seen at [facility].  Swelling right ankle.                HPI   Patients name: Beba  Appointment start time:  10:22 AM          Current Outpatient Medications   Medication Sig Dispense Refill     naproxen sodium 220 MG capsule Take 220 mg by mouth 2 times daily (with meals)       baclofen (LIORESAL) 10 MG tablet Take 0.5 tablets (5 mg) by mouth 3 times daily (Patient not taking: Reported on 4/27/2020) 20 tablet 0     calcium carbonate-vitamin D (OS-BENITO) 600-400 MG-UNIT chewable tablet Take 1 chew tab by mouth 2 times daily (Patient not taking: Reported on 4/27/2020) 30 tablet 3     ibuprofen (ADVIL/MOTRIN) 200 MG tablet Take 1 tablet (200 mg) by mouth every 4 hours as needed for mild pain or " "moderate pain (Patient not taking: Reported on 4/27/2020) 60 tablet 1     Allergies   Allergen Reactions     Tizanidine      Causes lightheadedness, dizziness, and severe abdominal pain.      She is hurting in her leg, which started on the 17th of April. This has been going on for about 10 days. Her leg was swollen, but that has decreased. Her leg is still swollen. This is right leg. It looks like she possibly ruptured her Achilles tendon. She is able to walk. She hasn't noticed any redness. It hurts in one little spot, but the pain has gone away since last night. She has been taking Aleve for her pain and that's been helping. She is wearing the boot she received in the ED. She attempted to call [facility], but was told she could not schedule an appointment with them as this was not an emergency. She was referred to them from the ED. We discussed that we would also refer her to the orthopedic surgeons for further assessment and will refer her there as well.         Review of Systems:     ROS COMP: See HPI.         Physical Exam:     Wt 102.1 kg (225 lb)   BMI 37.44 kg/m    Estimated body mass index is 37.44 kg/m  as calculated from the following:    Height as of 10/18/19: 1.651 m (5' 5\").    Weight as of this encounter: 102.1 kg (225 lb).    Exam:  Constitutional: healthy, alert and no distress  Psychiatric: mentation appears normal and affect normal/bright    Interface, Rad Results In - 04/17/2020  4:47 PM CDT  EXAM: XR ANKLE RIGHT 3 OR MORE VWS, XR FOOT RIGHT 3 OR MORE VWS  LOCATION: [facility]  DATE/TIME: 4/17/2020 4:39 PM    INDICATION: right ankle pain. sudden onset severe. worse over calcaneus.  COMPARISON: None.    IMPRESSION:   Medial soft tissue swelling. Slight cortical irregularity at the inferior margin of the medial malleolus suspicious for an avulsion fracture. The ankle mortise is intact. Small plantar calcaneal spur.    Mild hallux valgus with associated mild osteoarthritis at the " first MTP joint. No fracture or dislocation of the right foot.        Assessment and Plan   Beba was seen today for musculoskeletal problem.    Diagnoses and all orders for this visit:    Right leg pain: appears that she has possible avulsion fracture of her right medial malleolus and possible Achilles tendon rupture. She needs to see an orthopedic surgeon for further assessment of this issue. Discussed that we would refer her to [place] and help set up this appointment as this would be the next step if she came in to see us in person anyway.  -     ORTHOPEDICS ADULT REFERRAL; Future    Refilled medications that would be required in the next 3 months.     After Visit Information:  Patient declined AVS     No follow-ups on file.    Appointment end time: 10:29 AM  This is a telephone visit that took 7 minutes.      Clinician location:  [facility]    Leola Last MD  I precepted today with Dr. Jarvis.

## 2020-04-27 NOTE — PROGRESS NOTES
Preceptor Attestation:    I talked to the patient on the phone and discussed the patient with the resident. I have verified the content of the note, which accurately reflects my assessment of the patient and the plan of care.   Supervising Physician:  Reji Jarvis MD.

## 2020-04-29 NOTE — PATIENT INSTRUCTIONS
04/29/20   ORTHOPEDICS ADULT REFERRAL  [facility]  Phone: [phone]  Fax: [phone]    Online referral placed. They will contact patient to schedule.     Mora Dent

## 2020-06-05 ENCOUNTER — VIRTUAL VISIT (OUTPATIENT)
Dept: FAMILY MEDICINE | Facility: CLINIC | Age: 50
End: 2020-06-05
Payer: MEDICARE

## 2020-06-05 DIAGNOSIS — S82.891G: Primary | ICD-10-CM

## 2020-06-05 NOTE — PROGRESS NOTES
Preceptor Attestation:  Patient talked with, and evaluated and discussed with the resident. I have verified the content of the note, which accurately reflects my assessment of the patient and the plan of care.   Supervising Physician:  Eric Wing MD

## 2020-06-05 NOTE — Clinical Note
Please review and close this encounter on behalf of the preceptor that is away for greater than 7 days. No additional attestation statement needed. Thank you, Darlene

## 2020-06-05 NOTE — PATIENT INSTRUCTIONS
ORTHOPEDICS ADULT REFERRAL  [facility]  Phone: [phone]  Fax: [phone]    [address]  [address] [place] [address]    Appointment: Monday June 8th   Arrival Time:  8:15am  Provider:  Dr. Yanes             Patient Education     Leg Fracture    You have a break (fracture) of the leg. A fracture is treated with a splint, cast, or special boot. It will usually take at about 8 to 12 weeks for the fracture to heal, but it can take several months in some cases. If you have a severe injury, you may need surgery to fix it.  Home care  Follow these guidelines when caring for yourself at home:    You will be given a splint, cast, boot, or other device to keep the injured area from moving. Unless you were told otherwise, use crutches or a walker. Don t put weight on the injured leg until your healthcare provider says you can do so. (You can rent crutches and a walker at many pharmacies and surgical or orthopedic supply stores.)    Keep your leg elevated to reduce pain and swelling. When sleeping, put a pillow under the injured leg. When sitting, support the injured leg so it is above your waist. This is very important during the first 2 days (48 hours).    Put an ice pack on the injured area. Do this for 20 minutes every 1 to 2 hours the first day for pain relief. You can make an ice pack by wrapping a plastic bag of ice cubes in a thin towel. As the ice melts, be careful that the cast, splint, or boot doesn t get wet. You can put the ice pack directly over the splint or cast. Continue using the ice pack 3 to 4 times a day for the next 2 days. Then use the ice pack as needed to ease pain and swelling.    Keep the cast, splint, or boot completely dry at all times. Bathe with your cast, splint, or boot out of the water. Protect it with a large plastic bag, rubber-banded at the top end. If a boot or fiberglass cast or splint gets wet, you can dry it with a hair dryer.    You may use acetaminophen or  ibuprofen to control pain, unless another pain medicine was prescribed. If you have chronic liver or kidney disease, talk with your healthcare provider before using these medicines. Also talk with your provider if you ve had a stomach ulcer or gastrointestinal bleeding.    Don t put creams or objects under the cast if you have itching.  Follow-up care  Follow up with your healthcare provider, or as advised. This is to make sure the bone is healing the way it should. If a splint was put on, it may be converted to a cast at your next visit.  X-rays may be taken. You will be told of any new findings that may affect your care.  When to seek medical advice  Call your healthcare provider right away if any of these occur:    The cast or splint cracks    The plaster cast or splint becomes wet or soft    The fiberglass cast or splint stays wet for more than 24 hours    Bad odor from the cast or wound fluid stains the cast    Tightness or pain under the cast or splint gets worse    Toes become swollen, cold, blue, numb, or tingly    You can t move your toes    Skin around cast or splint becomes red    Fever of 100.4 F (38 C) or higher, or as directed by your healthcare provider  Date Last Reviewed: 2/1/2017 2000-2019 [employer]. [address] [place] [address], [place] [address]. All rights reserved. This information is not intended as a substitute for professional medical care. Always follow your healthcare professional's instructions.

## 2020-06-05 NOTE — PROGRESS NOTES
"Family Medicine Telephone Visit Note           Telephone Visit Consent   Patient was verbally read the following and verbal consent was obtained.    \"Telephone visits are billed at different rates depending on your insurance coverage. During this emergency period, for some insurers they may be billed the same as an in-person visit.  Please reach out to your insurance provider with any questions.  If during the course of the call the physician/provider feels a telephone visit is not appropriate, you will not be charged for this service.\"    Name person giving consent:  Patient   Date verbal consent given:  6/5/2020  Time verbal consent given:  10:43 AM       No chief complaint on file.           HPI   Patients name: Beba  Appointment start time:  10:48 AM    Beba is following up on her right ankle injury. She was seen in the ED on 4/17 where she was found to have an avulsion fracture of her right ankle with possible Achilles tendon rupture. The ED provider, Dr. Diaz spoke with Dr. Bae of [facility] and the plan was for Beba to schedule a follow up appointment. She attempted to call [facility] but was unable to schedule an appointment with them as she was told her injury was not emergent. She then followed up at [facility] on 4/27 and an additional referral was sent to [facility]. She has not heard back from them and has been unable to schedule an appointment.    Beba's right ankle is doing a little better. It's no longer swollen though it is . She tried to take off the boot and walk a week ago and the pain was terrible and she could not continue walking. She has been walking around with her boot and the pain is manageable with the boot. She was given the boot and crutches in the ED. She has used ice and taken OTC pain medication (Tylenol, Aleve) with some relief. She has not noted any new swelling, redness, fevers, chills. It is hard for her to know if her " "range of motion is limited because it is painful to move.      Current Outpatient Medications   Medication Sig Dispense Refill     baclofen (LIORESAL) 10 MG tablet Take 0.5 tablets (5 mg) by mouth 3 times daily (Patient not taking: Reported on 4/27/2020) 20 tablet 0     calcium carbonate-vitamin D (OS-BENITO) 600-400 MG-UNIT chewable tablet Take 1 chew tab by mouth 2 times daily (Patient not taking: Reported on 4/27/2020) 30 tablet 3     ibuprofen (ADVIL/MOTRIN) 200 MG tablet Take 1 tablet (200 mg) by mouth every 4 hours as needed for mild pain or moderate pain (Patient not taking: Reported on 4/27/2020) 60 tablet 1     naproxen sodium 220 MG capsule Take 220 mg by mouth 2 times daily (with meals)       Allergies   Allergen Reactions     Tizanidine      Causes lightheadedness, dizziness, and severe abdominal pain.               Review of Systems:     Constitutional, HEENT, cardiovascular, pulmonary, gi and gu systems are negative, except as otherwise noted.         Physical Exam:     There were no vitals taken for this visit.  Estimated body mass index is 37.44 kg/m  as calculated from the following:    Height as of 10/18/19: 1.651 m (5' 5\").    Weight as of 4/27/20: 102.1 kg (225 lb).    Exam:  Constitutional: healthy, alert and no distress  Psychiatric: mentation appears normal and affect normal/bright          Assessment and Plan   1. Avulsion fracture of right ankle, closed, with delayed healing, subsequent encounter  Patient had Xray showing avulsion fracture of right ankle on 4/17. She has not been able to follow up with orthopedic surgery as planned. She is still non-weight bearing 2 months after injury and needs evaluation for surgery or casting. [facility] scheduler was able to get in contact with [facility] and patient is scheduled for an appointment on 6/8 at 0815 in [place] with Dr. Yanes. Patient was instructed to remain non weight bearing and continue using ice and tylenol in the interim.  - " Follow up with [facility]      Refilled medications that would be required in the next 3 months.     After Visit Information:  Patient chose to view AVS via bookletmobile    No follow-ups on file.    Appointment end time: 11:22 AM  This is a telephone visit that took 34 minutes.      Clinician location:  Ajay Brewer MD  I precepted today with Dr. Wing.

## 2020-06-12 ENCOUNTER — TRANSFERRED RECORDS (OUTPATIENT)
Dept: HEALTH INFORMATION MANAGEMENT | Facility: CLINIC | Age: 50
End: 2020-06-12

## 2020-06-30 ENCOUNTER — TRANSFERRED RECORDS (OUTPATIENT)
Dept: HEALTH INFORMATION MANAGEMENT | Facility: CLINIC | Age: 50
End: 2020-06-30

## 2020-07-20 ENCOUNTER — TELEPHONE (OUTPATIENT)
Dept: FAMILY MEDICINE | Facility: CLINIC | Age: 50
End: 2020-07-20

## 2020-07-20 NOTE — TELEPHONE ENCOUNTER
Patient states she had a normal period 2 weeks ago and she has started again. She denies pain clotting excessive bleeding. Patient agreeable to scheduling an in clinic appointment.    Note routed to Dr.Aldrin Jil BUI

## 2020-07-20 NOTE — TELEPHONE ENCOUNTER
[facility] phone call message-patient reporting a symptom:     Symptom:     Pt is experiencing a second menstrual cycle.    Same Day Visit Offered: No    Additional comments:     None    OK to leave message on voice mail? Yes    Primary language: English      needed? No    Call taken on July 20, 2020 at 8:13 AM by Michelle Bauman CMA

## 2020-07-21 ENCOUNTER — OFFICE VISIT (OUTPATIENT)
Dept: FAMILY MEDICINE | Facility: CLINIC | Age: 50
End: 2020-07-21
Payer: MEDICARE

## 2020-07-21 VITALS
RESPIRATION RATE: 16 BRPM | DIASTOLIC BLOOD PRESSURE: 85 MMHG | TEMPERATURE: 98.6 F | HEART RATE: 59 BPM | SYSTOLIC BLOOD PRESSURE: 125 MMHG | OXYGEN SATURATION: 98 %

## 2020-07-21 DIAGNOSIS — N93.9 ABNORMAL UTERINE BLEEDING: Primary | ICD-10-CM

## 2020-07-21 LAB
FSH: 7.1 MIU/ML
HEMOGLOBIN: 14.6 G/DL (ref 11.7–15.7)
TSH SERPL DL<=0.05 MIU/L-ACNC: 2.61 UIU/ML (ref 0.3–5)

## 2020-07-21 NOTE — PATIENT INSTRUCTIONS
1. Schedule ultrasound for a Monday  2. Schedule follow up appointment w/ Dr. Cedillo in the next 1-2 weeks  3. Get labs today- I'll call you with results

## 2020-07-21 NOTE — PROGRESS NOTES
"       SUBJECTIVE       Beba Rosa is a 50 year old  female with a PMH significant for:     Patient Active Problem List   Diagnosis     Health Care Home     Herpes simplex virus (HSV) infection     Illiteracy and low-level literacy     Abnormal Pap smear of cervix     Chlamydia     Lateral epicondylitis of right elbow     Carpal tunnel syndrome of right wrist     Enlarged uterus-Normal US 4/2018     She presents with excessive bleeding. States her normal period happened this month July 5 for 5 days. Then again started bleeding on the on the 19th. This time it is very heavy. \"Pouring\" blood with small clotts, smaller than a dime. The clots are abnormal for her. She does note cramping pain, which is normal for her during her periods- takes some OTC medication- unsure of what it is. Normally, her period occurs very reguarly every month so this was unexpected. She uses pads and has been soaking thorugh about 5 per day and does have to change her pad overnight.     Last intercourse May 2020. She has not been using protection but has hx of tubal ligation. Notes a history of an STD (chlamydia) treated in the past. Denies any vaginal discharge or pain recently.     She believes her mother went through menopause in 50s but doesn't have any other female relative to compare to. She denies any hot flashes or other menopausal symptoms.     She does have a history of colposcopy in 2015 d/t ASCUS with high risk HPV. The biospy was negative for dysplasia or cancer. Since then has had 2 normal pap smears. Was noted to have enlarged uterus on exam in 2018. Had a normal pelvic ultrasound to evaluate.     PMH, Medications and Allergies were reviewed and updated as needed.        REVIEW OF SYSTEMS     CONSTITUTIONAL: NEGATIVE for fever, chills, change in weight  INTEGUMENTARY/SKIN: NEGATIVE for worrisome rashes, moles or lesions  EYES: NEGATIVE for vision changes or irritation  RESP: NEGATIVE for significant cough or SOB  CV: NEGATIVE " for chest pain, palpitations or peripheral edema  GI: NEGATIVE for nausea, abdominal pain, heartburn, or change in bowel habits  : NEGATIVE for frequency, dysuria, or hematuria  MUSCULOSKELETAL: NEGATIVE for significant arthralgias or myalgia  NEURO: NEGATIVE for weakness, dizziness   PSYCHIATRIC: NEGATIVE for changes in mood or affect        OBJECTIVE     Vitals:    07/21/20 1108   BP: 125/85   BP Location: Left arm   Patient Position: Sitting   Cuff Size: Adult Regular   Pulse: 59   Resp: 16   Temp: 98.6  F (37  C)   TempSrc: Oral   SpO2: 98%     There is no height or weight on file to calculate BMI.    Constitutional: Awake, alert, cooperative, no apparent distress, and appears stated age.  Eyes: Lids and lashes normal, extra ocular muscles intact, sclera clear, conjunctiva normal.  Neck: Supple, symmetrical, trachea midline, no adenopathy  Lungs: No increased work of breathing, good air exchange, clear to auscultation bilaterally, no crackles or wheezing.  Cardiovascular: Regular rate and rhythm, normal S1 and S2, no S3 or S4, and no murmur noted.  Abdomen:normal bowel sounds, soft, non-distended, non-tender, no masses palpated, no hepatosplenomegally.  Neurologic: Awake and alert. Cranial nerves II-XII are grossly intact.Gait is normal.  Neuropsychiatric: Normal affect, mood, orientation, memory and insight.  Skin: No rashes, erythema, pallor, petechia or purpura on exposed skin.     No results found for this or any previous visit (from the past 24 hour(s)).        ASSESSMENT AND PLAN     1. Abnormal uterine bleeding  Likely secondary to perimenopausal. Could also be related to thyroid dysfunction. Will check hemoglobin, TSH, and FSH. Will be due for pap smear again in 2022. Due to her history of enlarged uterus on ultrasound with endometrium thickness on the larger size of normal, will repeat pelvic ultrasound. If abnormal, could consider endometrial biopsy. Due to her currently being on her period, she  declined pelvic exam or pap smear today. Would recommend at least pelvic exam at next appointment.  Could consider further workup if not explained by perimenopausal status or thyroid dysfunction.  - [facility]  - Thyroid Cascade ([facility])  - Hemoglobin (HGB) ([facility])  - Pelvis complete (Tranabdominal & Transvaginal) (In Clinic); Future      RTC in 1-2 weeks for follow up of abnormal uterine bleeding or sooner if develops new or worsening symptoms.    Neha Cedillo MD PGY-2  [facility]    I precepted today with Dmitriy Quintanilla MD.

## 2020-07-21 NOTE — PROGRESS NOTES
Preceptor Attestation:    Patient seen and evaluated in person. I discussed the patient with the resident. I have verified the content of the note, which accurately reflects my assessment of the patient and the plan of care.   Supervising Physician:  Dmitriy Quintanilla MD.

## 2020-07-27 ENCOUNTER — TRANSFERRED RECORDS (OUTPATIENT)
Dept: HEALTH INFORMATION MANAGEMENT | Facility: CLINIC | Age: 50
End: 2020-07-27

## 2020-08-03 DIAGNOSIS — N93.9 ABNORMAL UTERINE BLEEDING: ICD-10-CM

## 2020-08-04 NOTE — PROGRESS NOTES
SUBJECTIVE       Beba Rosa is a 50 year old  female with a PMH significant for:     Patient Active Problem List   Diagnosis     Health Care Home     Herpes simplex virus (HSV) infection     Illiteracy and low-level literacy     Abnormal Pap smear of cervix     Chlamydia     Lateral epicondylitis of right elbow     Carpal tunnel syndrome of right wrist     Enlarged uterus-Normal US 4/2018     She presents for follow-up of abnormal bleeding and for pap smear.     At last visit presented with abnormal bleeding in between her period that was slightly more heavy than reguarly period.  Since then the bleeding stopped and has not recurred.    No vaginal discharge or pain. Last intercourse May 2020 without protection but does have hx of tubal ligation. Does have a history of chlamydia treated in past. She has has a history of colposcopy in 2015 d/t ASCUS with high risk HPV. The biospy was negative for dysplasia or cancer. Since then has had 2 normal pap smears without high risk HPV (last was 10/2017). Was noted to have enlarged uterus on exam in 2018. Had a normal pelvic ultrasound at that time. At last visit, ordered labs and ultrasound.  TSH and hemoglobin normal. FSH in pre-menopausal level. Needs pap at today's visit. Ultrasound showed uterine fibroid. Normal endomentrial thickness.     She also notes some pimples on her face have been bothering her.  She has a face wash that she got from her mother and wondering if it would be helpful.  Instructed her to bring them at her next appointment so we can look at it discuss further.  Also hoping to discuss weight loss options.  She wants to know about if weight loss shakes could be helpful.  We will discuss this at next visit.    PMH, Medications and Allergies were reviewed and updated as needed.        REVIEW OF SYSTEMS     CONSTITUTIONAL: NEGATIVE for fever, chills, change in weight  INTEGUMENTARY/SKIN: NEGATIVE for worrisome rashes, moles or lesions  EYES:  NEGATIVE for vision changes or irritation  RESP: NEGATIVE for significant cough or SOB  CV: NEGATIVE for chest pain, palpitations or peripheral edema  GI: NEGATIVE for nausea, abdominal pain, heartburn, or change in bowel habits  : NEGATIVE for frequency, dysuria, or hematuria  MUSCULOSKELETAL: NEGATIVE for significant arthralgias or myalgia  NEURO: NEGATIVE for weakness, dizziness   PSYCHIATRIC: NEGATIVE for changes in mood or affect        OBJECTIVE     Vitals:    08/07/20 1435   BP: 97/66   Pulse: 86   Resp: 20   Temp: 99.2  F (37.3  C)   TempSrc: Oral   SpO2: 98%     There is no height or weight on file to calculate BMI.    Constitutional: Awake, alert, cooperative, no apparent distress, and appears stated age.  Neck: Supple, symmetrical, trachea midline, no adenopathy  Lungs: No increased work of breathing, good air exchange, clear to auscultation bilaterally, no crackles or wheezing.  Cardiovascular: Regular rate and rhythm, normal S1 and S2, no S3 or S4, and no murmur noted.  Genitourinary: No urethral discharge, normal external genitalia, no hernia.  Gyn: Vagina healthy appearing with thin white malodorous discharge. Post-LEEP cervix normal appearance.   Musculoskeletal: Full range of motion noted.  Tone is normal.  Neurologic: Awake and alert. Cranial nerves II-XII are grossly intact. Gait is normal.  Neuropsychiatric: Normal affect, mood, orientation, memory and insight.  Skin: No rashes, erythema, pallor, petechia or purpura on exposed skin.     No results found for this or any previous visit (from the past 24 hour(s)).        ASSESSMENT AND PLAN     1. Abnormal uterine bleeding  Although she is not due for screening Pap smear, due to her history of ASCUS and also there is abnormal uterine bleeding will obtain one today. Work-up found to have clue cells as well as Trichomonas. Will treat appropriately.  Suspect that this abnormal uterine bleeding due to her being perimenopausal, although her FSH level  was within normal pre-menopausal limits.  TSH also normal.  There was a uterine fibroid found on ultrasound, which would explain heavier bleeding but not necessarily abnormal bleeding.  Normal endometrial thickness makes a uterine pathology less likely.  We will continue to monitor.  - [facility]  - Wet Prep ([facility])    2. Healthcare maintenance  Requesting a prescription refill today.  She does not like chocolate.  - calcium carbonate (OS-BENITO) 1500 (600 Ca) MG tablet; Take 1 tablet (600 mg) by mouth daily  Dispense: 30 tablet; Refill: 3    3. Trichomonal vaginitis  Clue cells and trichomonas present on wet prep today.  Prescribed 7-day course of metronidazole and instructed the patient to use this.  She is aware of the plan.          RTC in 1-2 weeks for follow up of acne and weight loss or sooner if develops new or worsening symptoms.    Neha Cedillo MD PGY-2  [facility]    I precepted today with Jerri Solorio MD.

## 2020-08-07 ENCOUNTER — OFFICE VISIT (OUTPATIENT)
Dept: FAMILY MEDICINE | Facility: CLINIC | Age: 50
End: 2020-08-07
Payer: MEDICARE

## 2020-08-07 VITALS
OXYGEN SATURATION: 98 % | SYSTOLIC BLOOD PRESSURE: 97 MMHG | HEART RATE: 86 BPM | DIASTOLIC BLOOD PRESSURE: 66 MMHG | TEMPERATURE: 99.2 F | RESPIRATION RATE: 20 BRPM

## 2020-08-07 DIAGNOSIS — N93.9 ABNORMAL UTERINE BLEEDING: Primary | ICD-10-CM

## 2020-08-07 DIAGNOSIS — Z00.00 HEALTHCARE MAINTENANCE: ICD-10-CM

## 2020-08-07 DIAGNOSIS — A59.01 TRICHOMONAL VAGINITIS: ICD-10-CM

## 2020-08-07 LAB
BACTERIA: NORMAL
CLUE CELLS: NORMAL
MOTILE TRICHOMONAS: POSITIVE
ODOR: NORMAL
PH WET PREP: NORMAL (ref 3.8–4.5)
WBC WET PREP: NORMAL (ref 2–5)
YEAST: NORMAL

## 2020-08-07 NOTE — PROGRESS NOTES
Preceptor Attestation:  Patient seen and evaluated in person. I discussed the patient with the resident. I have verified the content of the note, which accurately reflects my assessment of the patient and the plan of care.  Supervising Physician:  Danielle Solorio MD.

## 2020-08-10 LAB
FINAL DIAGNOSIS: NORMAL
HPV 16 DNA: NEGATIVE
HPV 18 DNA: NEGATIVE
HPV SOURCE: NORMAL
OTHER HR HPV: NEGATIVE
SPECIMEN DESCRIPTION: NORMAL

## 2020-08-11 DIAGNOSIS — B96.89 BV (BACTERIAL VAGINOSIS): Primary | ICD-10-CM

## 2020-08-11 DIAGNOSIS — A59.9 TRICHOMONAL INFECTION: ICD-10-CM

## 2020-08-11 DIAGNOSIS — N76.0 BV (BACTERIAL VAGINOSIS): Primary | ICD-10-CM

## 2020-08-11 RX ORDER — METRONIDAZOLE 500 MG/1
500 TABLET ORAL 2 TIMES DAILY
Qty: 14 TABLET | Refills: 0 | Status: SHIPPED | OUTPATIENT
Start: 2020-08-11 | End: 2020-08-18

## 2020-08-19 ENCOUNTER — RECORDS - HEALTHEAST (OUTPATIENT)
Dept: ADMINISTRATIVE | Facility: OTHER | Age: 50
End: 2020-08-19

## 2020-08-19 LAB
CYTOLOGY CVX/VAG DOC THIN PREP: NORMAL
HIGH RISK?: YES
HPV REFLEX?: NORMAL
LAB AP ABNORMAL BLEEDING: YES
LAB AP BIRTH CONTROL/HORMONES: NORMAL
LAB AP CERVICAL APPEARANCE: NORMAL
LAB AP GYN OTHER FINDINGS: NORMAL
LAB AP PATIENT STATUS: NORMAL
LAB AP PREVIOUS ABNL: NORMAL
LAB AP PREVIOUS NORMAL: 2017
LAST MENS PERIOD: NORMAL
PATH REPORT.COMMENTS IMP SPEC: NORMAL
PATH REPORT.COMMENTS IMP SPEC: NORMAL
SPECIMEN ADEQUACY:: NORMAL
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
## 2020-08-24 ENCOUNTER — TRANSFERRED RECORDS (OUTPATIENT)
Dept: HEALTH INFORMATION MANAGEMENT | Facility: CLINIC | Age: 50
End: 2020-08-24

## 2020-08-25 ENCOUNTER — OFFICE VISIT (OUTPATIENT)
Dept: FAMILY MEDICINE | Facility: CLINIC | Age: 50
End: 2020-08-25
Payer: MEDICARE

## 2020-08-25 VITALS
HEART RATE: 62 BPM | DIASTOLIC BLOOD PRESSURE: 78 MMHG | TEMPERATURE: 98.6 F | WEIGHT: 229 LBS | OXYGEN SATURATION: 100 % | BODY MASS INDEX: 38.15 KG/M2 | HEIGHT: 65 IN | SYSTOLIC BLOOD PRESSURE: 114 MMHG | RESPIRATION RATE: 18 BRPM

## 2020-08-25 DIAGNOSIS — Z59.9 FINANCIAL DIFFICULTIES: ICD-10-CM

## 2020-08-25 DIAGNOSIS — R87.619 ABNORMAL CERVICAL PAPANICOLAOU SMEAR, UNSPECIFIED ABNORMAL PAP FINDING: ICD-10-CM

## 2020-08-25 DIAGNOSIS — N93.9 ABNORMAL UTERINE BLEEDING: ICD-10-CM

## 2020-08-25 DIAGNOSIS — E66.01 MORBID OBESITY (H): Primary | ICD-10-CM

## 2020-08-25 LAB
CHOLEST SERPL-MCNC: 201.1 MG/DL (ref 0–200)
CHOLEST/HDLC SERPL: 3.5 {RATIO} (ref 0–5)
HBA1C MFR BLD: 5.2 % (ref 4.1–5.7)
HDLC SERPL-MCNC: 58 MG/DL
LDLC SERPL CALC-MCNC: 123 MG/DL (ref 0–129)
TRIGL SERPL-MCNC: 98.4 MG/DL (ref 0–150)
VLDL CHOLESTEROL: 19.7 MG/DL (ref 7–32)

## 2020-08-25 SDOH — ECONOMIC STABILITY - INCOME SECURITY: PROBLEM RELATED TO HOUSING AND ECONOMIC CIRCUMSTANCES, UNSPECIFIED: Z59.9

## 2020-08-25 ASSESSMENT — MIFFLIN-ST. JEOR: SCORE: 1669.62

## 2020-08-25 NOTE — PROGRESS NOTES
This SW met with Beba to discuss food resources. Beba states that she lives alone in her home, an apartment. She indicates that she recieves SNAP for herself, undisclosed amount. The amount is usually enough to get her through the month. The issue is she also takes care of her grandchildren quite a bit and her daughter will stay with her for days at a time. Her SNAP isn't enough to feed her family for the time they usually end up staying with her. Her adult son also spends some time at her place and eats her food. She states that because it is family she is always willing to share her food and money when needed, she cannot tell her children and grandchildren they can't eat. Her adult children have some instability in their own lives and often cannot make ends meet either, which is why they end up spending a lot of time at Beba's home.     SW empathized with Beba on her struggle with keeping her own food and helping her family. Acknowledged that she is very kind and has a good heart as well. Beba doesn't regularly utilize her local food shelf and is somewhat limited in her ability to transport large amounts of food due to having to use public transportation to get everywhere. She is aware of the food shelf in her neighborhood.     SW discussed other options that are close to clinic including [employer] [place]- provided patient with the information to contact them. Explained this is a food resource that is separate from the state food shelf system and can be used on a more semi regular basis. Also offered [employer] program to patient, however, she declined that. SW was able to give patient a MatterBox to take home with her today. She was very appreciative of that and will ask for one at each of her in clinic appointments. SW agreed that would be great for her to do.     At future visits, this SW will consider discussing how to assist Beba's daughter to sign up for SNAP for the children.     Rachelle De Paz,  NAVDEEP

## 2020-08-25 NOTE — Clinical Note
Please correct typo neutral biopsy change to endometrial biopsy under abnormal uterine bleeding.  Best wishes,  Dmitriy Quintanilla MD

## 2020-08-25 NOTE — PATIENT INSTRUCTIONS
1. Call the [facility] at [phone] to see if they participate in the silver sneaRainKing program.   2. Start walking 10 minutes per day, 5 days per week. Write it down when you do it!  3. Start a food diary. Record all the food you eat and when you eat it.   4. Get labs today.   5. I challenge you to reach out to your prior employer and try to get your job back- you would be such a great employee and I'm sure they miss you!  6. Your lifestyle clinic with Dr. Villarreal will be a virtual visit on sept 4 at 9:30 AM.   7. Please call clinic if any more abnormal bleeding.     08/27/20   MENTAL HEALTH REFERRAL    Mental Health referral routed to behavioral health team for recommendations. See Documentation Only encounter for more information.    Mora Dent

## 2020-08-27 ENCOUNTER — DOCUMENTATION ONLY (OUTPATIENT)
Dept: PSYCHOLOGY | Facility: CLINIC | Age: 50
End: 2020-08-27

## 2020-08-27 NOTE — PROGRESS NOTES
Behavioral Health Team,    Patient is being referred for mental health services by their provider, Dr. Cedillo.  Note and order indicate that this is a referral for a lifestyle clinic visit for weight loss. Appt scheduled for Friday September 4th at 9:30am with Dr. Villarreal.    Mora Dent  8/27/2020

## 2020-09-04 ENCOUNTER — TELEPHONE (OUTPATIENT)
Dept: PSYCHOLOGY | Facility: CLINIC | Age: 50
End: 2020-09-04

## 2020-09-04 NOTE — TELEPHONE ENCOUNTER
Called Beba at the time of our scheduled appointment today.  She had forgotten about the visit and was apologetic but needed to reschedule as she was leaving to attend another appointment.  Denied any acute needs today.  Offered to transfer her to the  to reschedule and she did accept this offer.  Did let her know that it would likely be the end of the month prior to my next opening and she stated she was fine with this timeline.  Review of my schedule at the conclusion of this call indicates that she rescheduled for Wednesday, September 30 at 2:30pm.    Radha Villarreal, PhD, LP

## 2020-10-19 ENCOUNTER — TELEPHONE (OUTPATIENT)
Dept: PSYCHOLOGY | Facility: CLINIC | Age: 50
End: 2020-10-19

## 2020-10-19 ENCOUNTER — VIRTUAL VISIT (OUTPATIENT)
Dept: PSYCHOLOGY | Facility: CLINIC | Age: 50
End: 2020-10-19
Payer: MEDICARE

## 2020-10-19 ENCOUNTER — TRANSFERRED RECORDS (OUTPATIENT)
Dept: HEALTH INFORMATION MANAGEMENT | Facility: CLINIC | Age: 50
End: 2020-10-19

## 2020-10-19 PROCEDURE — 96156 HLTH BHV ASSMT/REASSESSMENT: CPT | Mod: 95 | Performed by: PSYCHOLOGIST

## 2020-10-19 NOTE — Clinical Note
Beba will be following up with you on 11/3.  See my note for a recent update and let me know if you have any questions.  Thanks!  Radha

## 2020-10-19 NOTE — TELEPHONE ENCOUNTER
Placed outreach call to Beba at the time our of scheduled visit.  While we had scheduled this visit as a face to face visit at [place], Beba appeared a bit confused about the visit today and thought she was scheduled to be elsewhere.  I could not find other appointments in our system today.  After discussion, Beba was interested in converting our visit today to a phone visit.  See my note for additional detail.    Radha Villarreal, PhD, LP
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
## 2020-10-20 NOTE — PATIENT INSTRUCTIONS
Good to talk with you today Beba!  I'm so glad you've decided to invest in your health and well-being at this time.  Looking forward to continuing our conversation on 11/13.  We can clarify your goals for our work going forward at that visit.

## 2020-11-13 ENCOUNTER — TELEPHONE (OUTPATIENT)
Dept: PSYCHOLOGY | Facility: CLINIC | Age: 50
End: 2020-11-13

## 2020-11-13 DIAGNOSIS — Z65.3 PROBLEMS RELATED TO OTHER LEGAL CIRCUMSTANCES: Primary | ICD-10-CM

## 2020-11-13 NOTE — TELEPHONE ENCOUNTER
"Called Beba when she did not arrive for our scheduled visit at 11:00am.  We spoke for about 10-15 minutes today.  Beba apologized for forgetting about our appointment today.  Was talking with her daughter in [facility] and lost track of time.  \"She's going through something.\"  In addition, she is having problems with her income and medical coverage.  She reports she received a letter that benefits would be cut her off due to a \"large overpayment.\"  She is understandably alarmed and puzzled by this.  She reports she recently had to stop seeing her physical therapist at [facility], Dr. Andres Valdes, as she was worried about this. Therapist told her insurance was still active but she is not confident about this and doesn't want to get a bill so is reluctant to reschedule services at this time.    Offered referral to [facility] and she accepted this offer.  Encouraged her to call to reschedule once insurance concern is settled and she agreed to do so.    After our call, did speak with the  today and they did confirm that [employer] and Medicare coverage still looked good.  Will also ask sw to call Beba to discuss concerns about coverage with her today if possible.    Radha Villarreal, PhD, LP            "

## 2020-11-16 NOTE — TELEPHONE ENCOUNTER
Per review of [place] ITS, patient is active with a few different insurance policies that provide full coverage for health visits. Her coverage is [facility], QMB (which covers coinsurances and deductibles for Medicare) and Original Medicare A & B.     Medical Assistance (MA) coverages only term at the end of a calendar month. Beba should maitain that coverage, along with the QMB, at least through 11/30/2020.     Medicare coverage typically is never terminated. Once a person is enrolled in Medicare, it's a life long coverage.     Unless Beba is being asked to pay copays at the arrival to office visits or when seeking out cares, she should not terminates cares for fear of future bills. As coverage stands, currently, she has full coverage.     NAVDEEP Wilder

## 2020-11-16 NOTE — TELEPHONE ENCOUNTER
TAMARA called Beba to discuss the insurance information, see below. TAMARA was able to provide Beba with an overview of her current insurance coverage. Explained to her that her current coverage is comprehensive.     Beba clarifies that her medical bills are from past due accounts. She has not recieved any new medical bills from recent visits but is worried she could get bills. TAMARA assured her it is a very low likelihood that she will get any new bills, at least while she maintains the current coverage she has. She is relieved by this. Beba states she has an outstanding medical bill from a hospitalization in 2019. She also has an ambulance ride bill. She is not sure what her health coverage was at that time. She was receiving these bills reoccurring so has thrown many of them away. This SW indicates that if she wants to bring the ones she does have to clinic, and drop them off, this SW could review the bills and try to find out if coverage was active during that time. Some of these bills might be with a collection agencies but Beba is not clear on that either.     The separate issue, not insurance related, is with Beba's RSDI benefits that she gets from the [employer]. She indicates that about a year ago, she had started receiving letters from the SSA informing her that she had be grossly overpaid in RSDI benefits and she would have to pay back a large sum of money. Beba worked with , an  at [facility] named Adelaida, and she indicates the problem was resolved. She recently starting receiving the same letters from the [facility] and she is worried something is going on. This SW indicates that  is likely the best intervention for this issue as they were heavily invovled in resolving the issue last year. Beba agree's. She will wait to hear from a  .     This SW will assist with looking at past medical bills and determine if coverage was active  then for payment on those accounts or help Beba come up with an alternative plan.     NAVDEEP Wilder

## 2020-11-18 NOTE — TELEPHONE ENCOUNTER
November 18, 2020   Legal Services Referral - [place] only  Legal referral has been sent to Noreen Cardenas from [facility].     Scan/Send demographics and referral to [email or web address]    She will review, advise, and contact the patient.     Mora Dent

## 2020-11-24 NOTE — TELEPHONE ENCOUNTER
Beba dropped off 4 bills from "[facility]. TAMARA reviewed them, on this date, and called the [facility] billing office. They explain that the current outstanding balance is $41.00, however, that amount was not sent to [facility] yet. They will bill it out to [facility] and patient will very likely be at a $0.00 balance. TAMARA kept the copies of the bills in the  f/u folder. Will contact Beba to see if she wants them back or if they should be shredded.     TAMARA called Beba to f/u on the above. She did not answer. TAMARA did leave a detailed message and requested a return call. Will try to investigate further if the "[facility] [facility] bills were the only outstanding medical bills or if there are others. Per previous conversation with Beba, she had mentioned hospitalization bill and an ambulance ride bill.     NAVDEEP Wilder

## 2020-12-02 ENCOUNTER — TELEPHONE (OUTPATIENT)
Dept: FAMILY MEDICINE | Facility: CLINIC | Age: 50
End: 2020-12-02

## 2020-12-02 NOTE — TELEPHONE ENCOUNTER
This SW reached out to Beba to encourage her scheduling f/u with PCP and Dr. Villarreal. Beba indicates that she is currently out of town. She is visiting her daughter and grandchild(ajith). She thinks she'll be back in 1-2 weeks. TAMARA offered to reach out to her again in a few weeks to assist with scheduling but Beba indicates she'd rather contact the clinic when she's back and ready.     This TAMARA and Beba have connected on a few occasions previously. See this SW's note from 11/16/2020 and 11/24/2020 for full details. In short, TAMARA was able to provide some insurance education to Beba that assured her that her current insurance coverage (verified that same coverage is active today, 12/02/2020) is comprehensive and she should not worry about any bills resulting from MD/DO or psychotherapy visits. She indicated understanding of this.     This SW would like to discuss, at a future time, what other outstanding bills Beba might have. She had mentioned a hospitalization and ambulance ride, however, the bills she dropped off to clinic that this SW reviewed and resolved, were from her orthopedic provider.     Routing to Dr. Villarreal for FYI.     NAVDEEP Wilder

## 2020-12-02 NOTE — TELEPHONE ENCOUNTER
----- Message from Radha Villarreal, PhD sent at 12/1/2020 10:44 AM CST -----  Regarding: schedule follow up care/insurance barriers?  [place] - you talked with this patient back on 11/13 re: some insurance barriers to care.  I know she was afraid of scheduling any additional follow up until this concern was resolved and it does not appear that she has called back to reschedule with myself or Dr. Cedillo as of yet.  Wondering if you might be willing to reach out to her to check on this/see if she needs more help with this and see is she is ready to reschedule care if this issue has been resolved.  Let me know if you think you would have time for that.  Otherwise, I can just have the  reach out to her but I thought if there were still problems you may be the best to have this conversation with her.  Thanks!  Radha

## 2020-12-06 ENCOUNTER — HEALTH MAINTENANCE LETTER (OUTPATIENT)
Age: 50
End: 2020-12-06

## 2021-02-04 ENCOUNTER — OFFICE VISIT (OUTPATIENT)
Dept: FAMILY MEDICINE | Facility: CLINIC | Age: 51
End: 2021-02-04
Payer: MEDICARE

## 2021-02-04 VITALS
SYSTOLIC BLOOD PRESSURE: 112 MMHG | HEART RATE: 72 BPM | TEMPERATURE: 98.6 F | RESPIRATION RATE: 16 BRPM | DIASTOLIC BLOOD PRESSURE: 72 MMHG | OXYGEN SATURATION: 98 %

## 2021-02-04 DIAGNOSIS — Z80.0 FAMILY HISTORY OF COLON CANCER: ICD-10-CM

## 2021-02-04 DIAGNOSIS — R35.1 NOCTURIA: ICD-10-CM

## 2021-02-04 DIAGNOSIS — R20.2 NUMBNESS AND TINGLING IN RIGHT HAND: Primary | ICD-10-CM

## 2021-02-04 DIAGNOSIS — R20.0 NUMBNESS AND TINGLING IN RIGHT HAND: Primary | ICD-10-CM

## 2021-02-04 DIAGNOSIS — Z00.00 HEALTHCARE MAINTENANCE: ICD-10-CM

## 2021-02-04 PROCEDURE — 99213 OFFICE O/P EST LOW 20 MIN: CPT | Mod: GC | Performed by: STUDENT IN AN ORGANIZED HEALTH CARE EDUCATION/TRAINING PROGRAM

## 2021-02-04 NOTE — PROGRESS NOTES
"  Assessment & Plan     Numbness and tingling in right hand  Currently not experiencing this issue in the office. Could be related to sleeping on an air mattress, though she does not have another sleeping option at this time. No red flag symptoms that would be concerning for signs of cauda equina or other nerve impingement. No numbness/tingling that would indicate pinched nerve. Review symptoms to watch out for.     Nocturia  Patient has a history of this without UTI symptoms though does drink lots of water before bed. Reviewed symptoms to watch out for and gave patient information from the society for pelvic floor disorders to review.  - UA with micro at next visit    Healthcare maintenance    - calcium carbonate-vitamin D (OS-BENITO) 600-400 MG-UNIT chewable tablet  Dispense: 90 tablet; Refill: 1    Family history of colon cancer  Father had colon cancer in 50s. Should start screening now.  - GASTROENTEROLOGY ADULT REF PROCEDURE ONLY      Diagnosis or treatment significantly limited by social determinants of health - tobacco use       BMI:   Estimated body mass index is 38.11 kg/m  as calculated from the following:    Height as of 8/25/20: 1.651 m (5' 5\").    Weight as of 8/25/20: 103.9 kg (229 lb).       Return in about 2 weeks (around 2/18/2021).    Leoal Last MD  [facility] BJ Yoder is a 50 year old who presents to clinic today for the following health issues:    Chief Complaint   Patient presents with     Numbness     Pt is here to discuss her R leg and hand numbness.  She states it is bothering her and waking her out of her sleep.      Medication Reconciliation     Complete.          HPI     She has numbness in her hand and leg. The numbness comes and goes. The numbness comes when she lays down. She is worried that this is messing with her arm. She goes to donate plasma and is worried that it could be coming from that. This has been going on for two weeks. She " doesn't have any weakness in her arms. It goes away during the day. She doesn't have the numbness right now. It feels only a little like pins and needles. This hasn't happened before. She tried to sleep in a different position. She sleeps on an air bed because she had to get rid of her regular bed.     She has some issues with urination and she has to go to the bathroom at night due to urination at 2am, 4am, and this bothers her. She denies incontinence of bowel and bladder.    She has questions about her colon. It's a lemon colon cleanser. Her daughter told her that she was worried about her colon. She has a family history of colon cancer, both in mother and father. Her mom had breast cancer. Her dad was diagnosed with colon cancer around the time he turned 50.       Review of Systems   See HPI      Objective    /72 (BP Location: Left arm, Patient Position: Sitting, Cuff Size: Adult Large)   Pulse 72   Temp 98.6  F (37  C) (Oral)   Resp 16   SpO2 98%   There is no height or weight on file to calculate BMI.  Physical Exam   GENERAL: healthy, alert and no distress  NEURO: grasp strength 5/5 bilaterally, moves both arms through full range of motion spontaneously  MS: no gross musculoskeletal defects noted, no edema    Office Visit on 08/25/2020   Component Date Value Ref Range Status     Hemoglobin A1C 08/25/2020 5.2  4.1 - 5.7 % Final     Cholesterol 08/25/2020 201.1* 0.0 - 200.0 mg/dL Final     Cholesterol/HDL Ratio 08/25/2020 3.5  0.0 - 5.0 Final     HDL Cholesterol 08/25/2020 58.0  >40.0 mg/dL Final     LDL Cholesterol Calculated 08/25/2020 123  0 - 129 mg/dL Final     Triglycerides 08/25/2020 98.4  0.0 - 150.0 mg/dL Final     VLDL Cholesterol 08/25/2020 19.7  7.0 - 32.0 mg/dL Final       ----- Service Performed and Documented by Resident or Fellow ------

## 2021-02-05 ENCOUNTER — TELEPHONE (OUTPATIENT)
Dept: GASTROENTEROLOGY | Facility: CLINIC | Age: 51
End: 2021-02-05

## 2021-02-05 NOTE — TELEPHONE ENCOUNTER
Spoke with patient in regards to scheduling her colonoscopy. Patient is declining services as she refuses to get tested for COVID prior to procedure.    Procedure: Lower Endoscopy    Lower Endoscopy Type: Colonoscopy    Purpose of Colonoscopy Procedure: Screening    Colonoscopy Sedation: Conscious/Moderate    Preferred Location: [facility]    Scheduling Instructions: If you have not heard from the scheduling office within 2 business days, please call [phone].      Dx: Family history of colon cancer [Z80.0]

## 2021-02-20 ENCOUNTER — HEALTH MAINTENANCE LETTER (OUTPATIENT)
Age: 51
End: 2021-02-20

## 2021-03-01 ENCOUNTER — OFFICE VISIT (OUTPATIENT)
Dept: FAMILY MEDICINE | Facility: CLINIC | Age: 51
End: 2021-03-01
Payer: MEDICARE

## 2021-03-01 VITALS
DIASTOLIC BLOOD PRESSURE: 79 MMHG | HEIGHT: 66 IN | HEART RATE: 79 BPM | TEMPERATURE: 98.1 F | BODY MASS INDEX: 36.96 KG/M2 | WEIGHT: 230 LBS | RESPIRATION RATE: 14 BRPM | SYSTOLIC BLOOD PRESSURE: 111 MMHG

## 2021-03-01 DIAGNOSIS — R39.89 URINARY PROBLEM: Primary | ICD-10-CM

## 2021-03-01 DIAGNOSIS — Z80.0 FAMILY HISTORY OF COLON CANCER: ICD-10-CM

## 2021-03-01 DIAGNOSIS — D25.9 UTERINE LEIOMYOMA, UNSPECIFIED LOCATION: ICD-10-CM

## 2021-03-01 PROCEDURE — 99214 OFFICE O/P EST MOD 30 MIN: CPT | Mod: GC | Performed by: STUDENT IN AN ORGANIZED HEALTH CARE EDUCATION/TRAINING PROGRAM

## 2021-03-01 RX ORDER — IBUPROFEN 600 MG/1
600 TABLET, FILM COATED ORAL EVERY 6 HOURS PRN
Qty: 60 TABLET | Refills: 1 | Status: SHIPPED | OUTPATIENT
Start: 2021-03-01 | End: 2021-03-16

## 2021-03-01 ASSESSMENT — MIFFLIN-ST. JEOR: SCORE: 1682.08

## 2021-03-01 NOTE — PROGRESS NOTES
Preceptor Attestation:    I discussed the patient with the resident and evaluated the patient in person. I have verified the content of the note, which accurately reflects my assessment of the patient and the plan of care.   Supervising Physician:  Graham Calvert MD.

## 2021-03-01 NOTE — PROGRESS NOTES
"    Assessment & Plan     Urinary problem  Improved from last visit with behavioral adjustments (reduced water intake) and is no longer bothersome to patient  - Follow up if urinary frequency occurs    Uterine leiomyoma, unspecified location  Reviewed ultrasounds from 2018 and 2020, which was normal and showed a fibroid respectively. She likely has some component of going through menopause as well as having a fibroid. Discussed that the best way to reduce the blood flow/loss during her period was to take medicine like ibuprofen, which constricts the arteries to the uterus. Sent in script for this today. Recommended trying this during her next period and following up if it does not help. No symptoms of anemia at this time.  - ibuprofen (ADVIL/MOTRIN) 600 MG tablet  Dispense: 60 tablet; Refill: 1    Family history of colon cancer  Reviewed that since she has a family history of colon cancer she should have colonoscopy. She declined this today after discussion. She said she would think about this and talk to me the next time she sees me.    Reviewed vaginal ultrasound from 2018 and 2020 with patient.       Review of the result(s) of each unique test - ultrasounds   SDOH - smoking       BMI:   Estimated body mass index is 37.69 kg/m  as calculated from the following:    Height as of this encounter: 1.664 m (5' 5.5\").    Weight as of this encounter: 104.3 kg (230 lb).       Return for with me after your next period if you don't have good relief with the ibuprofen.    Leola Last MD  [facility] BJ Yoder is a 50 year old who presents for the following health issues:  Chief Complaint   Patient presents with     Vaginal Problem     LMP 2/16/21, Another 2/26/21       HPI   The colonoscopy confused her and she was worried about having it. She was afraid. She said she was going through something when that person called. She wasn't aware she had to do a covid19 test. She doesn't want " "to have the colonoscopy even though she has a family history of colon cancer. She said she would think about having a colonoscopy and then let me know at her next visit if she's okay with the colonoscopy.    She's had 2 menses this month. She had her period 2/16-2/21 and then again 2/26. She is still on her period. She says the bleeding is pretty heavy. She is having clots. She's having some cramping, dull cramping. It's not hurting so bad but it's there. This is really irritating her. She feels down and out because of this. She says that this is constant. She said she had her period in January either on the 16th or 26th. She does recall. She said she was bleeding a lot in January. She takes Midol for her cramps. She has taken some ibuprofen 2 pills once per day with this last period for a couple days. She says that the bleeding is what's bothering the most. She hasn't gone through menopause yet. She does have some hot flashes.     She hasn't felt dizzy, lightheaded, nor short of breath.     She isn't having any urinary symptoms anymore. She said she cut back on drinking fluids and that helped significantly.       Review of Systems   See HPI      Objective    /79   Pulse 79   Temp 98.1  F (36.7  C)   Resp 14   Ht 1.664 m (5' 5.5\")   Wt 104.3 kg (230 lb)   BMI 37.69 kg/m    Body mass index is 37.69 kg/m .  Physical Exam   GENERAL: healthy, alert and no distress  RESP: breathing comfortably on room air, mask over mouth and nose  MS: no gross musculoskeletal defects noted, no edema    No results found for this or any previous visit (from the past 24 hour(s)).    ----- Service Performed and Documented by Resident or Fellow ------          "

## 2021-03-01 NOTE — PROGRESS NOTES
Preceptor Attestation:   Patient seen, evaluated and discussed with the resident. I have verified the content of the note, which accurately reflects my assessment of the patient and the plan of care.   Supervising Physician:  Kristy Merino MD

## 2021-03-16 ENCOUNTER — OFFICE VISIT (OUTPATIENT)
Dept: FAMILY MEDICINE | Facility: CLINIC | Age: 51
End: 2021-03-16
Payer: MEDICARE

## 2021-03-16 VITALS
TEMPERATURE: 98 F | HEART RATE: 71 BPM | RESPIRATION RATE: 16 BRPM | OXYGEN SATURATION: 99 % | WEIGHT: 226.2 LBS | SYSTOLIC BLOOD PRESSURE: 124 MMHG | DIASTOLIC BLOOD PRESSURE: 85 MMHG | BODY MASS INDEX: 37.69 KG/M2 | HEIGHT: 65 IN

## 2021-03-16 DIAGNOSIS — M77.11 LATERAL EPICONDYLITIS OF RIGHT ELBOW: Primary | ICD-10-CM

## 2021-03-16 PROCEDURE — 99213 OFFICE O/P EST LOW 20 MIN: CPT | Performed by: FAMILY MEDICINE

## 2021-03-16 RX ORDER — NAPROXEN 500 MG/1
500 TABLET ORAL 2 TIMES DAILY WITH MEALS
Qty: 60 TABLET | Refills: 3 | Status: SHIPPED | OUTPATIENT
Start: 2021-03-16 | End: 2023-02-01

## 2021-03-16 ASSESSMENT — MIFFLIN-ST. JEOR: SCORE: 1663.17

## 2021-03-16 NOTE — PROGRESS NOTES
"Assessment & Plan     Lateral epicondylitis of right elbow    - Miscellaneous Order for DME - ONLY FOR DME  - naproxen (NAPROSYN) 500 MG tablet; Take 1 tablet (500 mg) by mouth 2 times daily (with meals)    25 minutes spent on the date of the encounter doing chart review, patient visit and documentation        BMI:   Estimated body mass index is 37.19 kg/m  as calculated from the following:    Height as of this encounter: 1.661 m (5' 5.39\").    Weight as of this encounter: 102.6 kg (226 lb 3.2 oz).   Weight management plan: Discussed healthy diet and exercise guidelines      Kayden Jarvis MD  [facility]     Options for treatment and/or follow-up care were reviewed with the patient. Beba Rosa was engaged and actively involved in the decision making process. She verbalized understanding of the options discussed and was satisfied with the final plan.      Subjective: Beba Rosa is a 50 year old comes in with a complaint of pain in the right arm that is been present now for about a month.  She states it is getting worse.  So far she was seen in the clinic for this and given some ibuprofen.  She states it may be helped a bit when she is taken it but has not led to lasting relief.  At this point she is not employed but she does a lot of work at home.  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  She recalls no specific trauma to this area.  PMHX/PSHX/MEDS/ALLERGIES/SHX/FHX reviewed and updated in Epic.   ROS:   General: No fevers  GI: No nausea or vomiting.  No constipation or diarrhea.  Objective: /85 (BP Location: Left arm, Patient Position: Sitting, Cuff Size: Adult Regular)   Pulse 71   Temp 98  F (36.7  C) (Oral)   Resp 16   Ht 1.661 m (5' 5.39\")   Wt 102.6 kg (226 lb 3.2 oz)   LMP 03/03/2021 (Exact Date)   SpO2 99%   BMI 37.19 kg/m     Gen: Well nourished and in NAD   Extrem: no cyanosis, edema or clubbing "   Ortho: Cozen test and Maudsley test are positive.  Phalen and Tinel test are negative.  Psych: Euthymic        Kayden Jarvis MD

## 2021-03-16 NOTE — PATIENT INSTRUCTIONS
[facility]    [address], [place], [place] [id number] [phone]

## 2021-05-26 ENCOUNTER — RECORDS - HEALTHEAST (OUTPATIENT)
Dept: ADMINISTRATIVE | Facility: CLINIC | Age: 51
End: 2021-05-26

## 2021-07-30 ENCOUNTER — OFFICE VISIT (OUTPATIENT)
Dept: FAMILY MEDICINE | Facility: CLINIC | Age: 51
End: 2021-07-30
Payer: MEDICARE

## 2021-07-30 VITALS
SYSTOLIC BLOOD PRESSURE: 116 MMHG | TEMPERATURE: 98.1 F | OXYGEN SATURATION: 99 % | WEIGHT: 223.6 LBS | BODY MASS INDEX: 36.76 KG/M2 | RESPIRATION RATE: 16 BRPM | DIASTOLIC BLOOD PRESSURE: 81 MMHG | HEART RATE: 60 BPM

## 2021-07-30 DIAGNOSIS — H00.015 HORDEOLUM EXTERNUM OF LEFT LOWER EYELID: Primary | ICD-10-CM

## 2021-07-30 DIAGNOSIS — L30.9 DERMATITIS: ICD-10-CM

## 2021-07-30 PROCEDURE — 99214 OFFICE O/P EST MOD 30 MIN: CPT | Mod: GC | Performed by: STUDENT IN AN ORGANIZED HEALTH CARE EDUCATION/TRAINING PROGRAM

## 2021-07-30 RX ORDER — DIAPER,BRIEF,INFANT-TODD,DISP
EACH MISCELLANEOUS 2 TIMES DAILY
Qty: 56 G | Refills: 0 | Status: SHIPPED | OUTPATIENT
Start: 2021-07-30

## 2021-07-30 NOTE — PROGRESS NOTES
Assessment & Plan     Hordeolum externum of left lower eyelid  Beba is a 51 year old woman presenting for 3 days of left lower eyelid swelling with exam showing a stye of the left lower eyelid. No systemic symptoms. Patient educated to apply warm compress multiple times a day until the stye resolves, and to reach out to clinic if it worsens.    Dermatitis  -Small pruritic nodule on the left buttock is most consistent with insect bite or superficial dermatitis  - Hydrocortisone 1% ointment sent to pharmacy, patient educated to apply ointment twice daily until itchiness and bump resolve. Will reach out to clinic if worsens.    Patient was discussed with attending physician, Dr. Graham Calvert MD, who agrees with the assessment and plan.    Madhavi Sarkar MD, PGY-2  [facility]  7/30/2021      Subjective   Beba Rosa is a 51 year old female who presents for a bump on her left eye. The patient reports waking up to swelling underneath her left eye three days ago. The swelling was localized only inferior to the eye with no resulting blocking of vision or any reported changes in vision. She additionally had blood shot eyeball on that side for one day after the swelling occurred. Beba denies any warmness to touch, denies fever, denies runny nose, denies exudate from the eye. She tried both ice and heat on the eye but felt neither helped. The swelling has improved slightly in the last two days but she finds her eye is still dry and irritated.    Beba does not wear contacts or use eyedrops. She comments that it may be possible that some glitter from her makeup or an eyelash fell below the eye. She denies pain with eye movement, just sand-paper irritation intermittently.     Additionally Beba reports a new small hard warm bump on her right buttock that showed up yesterday night. It is pruritic, she is mostly concerned that it may turn into an abscess, as she has had an abscess  drainage previously that she did not enjoy.    Review of Systems   CONSTITUTIONAL:NEGATIVE for fever, chills, change in weight  INTEGUMENTARY/SKIN: As described in HPI, swelling below left eye and bump on right buttock  EYES: NEGATIVE for vision changes. Irritation of the left eye with increased eye movement or opening.  ENT/MOUTH: No nasal drainage, sore throat, ear aches  RESP:NEGATIVE for significant cough or SOB  GI: NEGATIVE for constipation, nausea, or diarrhea    Objective    /81 (BP Location: Left arm, Patient Position: Sitting, Cuff Size: Adult Regular)   Pulse 60   Temp 98.1  F (36.7  C) (Oral)   Resp 16   Wt 101.4 kg (223 lb 9.6 oz)   LMP 07/16/2021 (Exact Date)   SpO2 99%   BMI 36.76 kg/m   (Date 7/30, not 7/16)  Physical Exam   General: alert, appears comfortable, no acute distress  HEENT: atraumatic, conjunctiva clear bilaterally,  EOM's intact, fast blinking and irritation after testing EOM, pupils equal and reactive bilaterally, mild edema underneath the left eye inferior to the eyelid with no focal density, a pinpoint light yellow dot seen in center of swelling on inside of left lower eyelid   Neck: supple without lympadenopathy  Cardiac: normal rate and rhythm with no murmurs or extra sounds  Resp: lungs clear to auscultation bilaterally with no crackles or wheezes, no increased work of breathing  Skin: approximately 1 cm erythematous rough papule on superior medial right buttocks with no fluctuance, appears superficial, no exudate, no vesicular appearance

## 2021-07-30 NOTE — PATIENT INSTRUCTIONS
Plan:  1. It was a pleasure seeing you in clinic today.  2. The swelling around your left eye is caused by a stye- which is a blocked duct on the inside of your lower eyelid. Use warm compresses (washcloths) on your eye 3 times a day until the swelling and irritation goes away.   3. The bump on your right bottom looks like mild superficial irritation of the skin. No signs of an abscess. Use hydrocortisone cream or ointment twice a day until it feels better. If it gets any worse, come back to see us.    [facility]  Phone: [phone]  Address: [address] [place] [address]

## 2021-08-05 ENCOUNTER — TELEPHONE (OUTPATIENT)
Dept: FAMILY MEDICINE | Facility: CLINIC | Age: 51
End: 2021-08-05

## 2021-08-05 NOTE — TELEPHONE ENCOUNTER
hydrocortisone (CORTAID) 1 % external ointment is not covered by insurance. The preferred alternative is Triamcinolonoin

## 2021-08-11 DIAGNOSIS — L30.9 DERMATITIS: Primary | ICD-10-CM

## 2021-08-11 RX ORDER — TRIAMCINOLONE ACETONIDE 1 MG/G
CREAM TOPICAL 2 TIMES DAILY
Qty: 45 G | Refills: 0 | Status: SHIPPED | OUTPATIENT
Start: 2021-08-11

## 2021-08-20 ENCOUNTER — OFFICE VISIT (OUTPATIENT)
Dept: FAMILY MEDICINE | Facility: CLINIC | Age: 51
End: 2021-08-20
Payer: MEDICARE

## 2021-08-20 VITALS
RESPIRATION RATE: 16 BRPM | DIASTOLIC BLOOD PRESSURE: 78 MMHG | OXYGEN SATURATION: 100 % | HEART RATE: 72 BPM | BODY MASS INDEX: 36.38 KG/M2 | TEMPERATURE: 98.4 F | HEIGHT: 66 IN | SYSTOLIC BLOOD PRESSURE: 113 MMHG | WEIGHT: 226.4 LBS

## 2021-08-20 DIAGNOSIS — B00.9 HERPES SIMPLEX VIRUS INFECTION: ICD-10-CM

## 2021-08-20 DIAGNOSIS — Z11.3 ROUTINE SCREENING FOR STI (SEXUALLY TRANSMITTED INFECTION): ICD-10-CM

## 2021-08-20 DIAGNOSIS — M54.42 ACUTE BILATERAL LOW BACK PAIN WITH LEFT-SIDED SCIATICA: Primary | ICD-10-CM

## 2021-08-20 DIAGNOSIS — Z12.31 ENCOUNTER FOR SCREENING MAMMOGRAM FOR BREAST CANCER: ICD-10-CM

## 2021-08-20 DIAGNOSIS — B00.1 COLD SORE: ICD-10-CM

## 2021-08-20 PROCEDURE — 87591 N.GONORRHOEAE DNA AMP PROB: CPT | Performed by: STUDENT IN AN ORGANIZED HEALTH CARE EDUCATION/TRAINING PROGRAM

## 2021-08-20 PROCEDURE — 87491 CHLMYD TRACH DNA AMP PROBE: CPT | Performed by: STUDENT IN AN ORGANIZED HEALTH CARE EDUCATION/TRAINING PROGRAM

## 2021-08-20 PROCEDURE — 99214 OFFICE O/P EST MOD 30 MIN: CPT | Mod: 25 | Performed by: STUDENT IN AN ORGANIZED HEALTH CARE EDUCATION/TRAINING PROGRAM

## 2021-08-20 PROCEDURE — G0439 PPPS, SUBSEQ VISIT: HCPCS | Mod: GC | Performed by: STUDENT IN AN ORGANIZED HEALTH CARE EDUCATION/TRAINING PROGRAM

## 2021-08-20 RX ORDER — VALACYCLOVIR HYDROCHLORIDE 500 MG/1
500 TABLET, FILM COATED ORAL 2 TIMES DAILY
Qty: 14 TABLET | Refills: 0 | Status: SHIPPED | OUTPATIENT
Start: 2021-08-20 | End: 2021-08-27

## 2021-08-20 RX ORDER — NAPROXEN SODIUM 220 MG
220 TABLET ORAL 2 TIMES DAILY WITH MEALS
Qty: 120 TABLET | Refills: 1 | Status: SHIPPED | OUTPATIENT
Start: 2021-08-20 | End: 2023-02-01

## 2021-08-20 RX ORDER — CYCLOBENZAPRINE HCL 5 MG
5 TABLET ORAL 3 TIMES DAILY PRN
Qty: 45 TABLET | Refills: 0 | Status: SHIPPED | OUTPATIENT
Start: 2021-08-20 | End: 2022-03-01

## 2021-08-20 RX ORDER — DOCOSANOL 100 MG/G
CREAM TOPICAL
Qty: 2 G | Refills: 0 | Status: SHIPPED | OUTPATIENT
Start: 2021-08-20

## 2021-08-20 ASSESSMENT — MIFFLIN-ST. JEOR: SCORE: 1664.69

## 2021-08-20 NOTE — PROGRESS NOTES
"[facility] Medicine Office Visit Note    Patient: Beba Rosa  : 1970  MRN: [id number]         Objective      Vitals:    21 1416   BP: 113/78   BP Location: Left arm   Patient Position: Sitting   Cuff Size: Adult Large   Pulse: 72   Resp: 16   Temp: 98.4  F (36.9  C)   TempSrc: Oral   SpO2: 100%   Weight: 102.7 kg (226 lb 6.4 oz)   Height: 1.67 m (5' 5.75\")     Body mass index is 36.82 kg/m .          Female Physical Note         HPI         Concerns today:   - Low back pain (see above)   - Lip sore   - Genital sores    Annual Wellness Visit  Patient has been advised of split billing requirements and indicates understanding: Yes     Are you in the first 12 months of your Medicare Part B coverage?  Doesn't know    Physical Health:    In general, how would you rate your overall physical health? good    Outside of work, how many days during the week do you exercise?none    Outside of work, approximately how many minutes a day do you exercise?not applicable    If you drink alcohol do you typically have >3 drinks per day or >7 drinks per week? No    Do you usually eat at least 4 servings of fruit and vegetables a day, include whole grains & fiber and avoid regularly eating high fat or \"junk\" foods? NO    Do you have any problems taking medications regularly? No    Do you have any side effects from medications? none    Needs assistance for the following daily activities: reading    Which of the following safety concerns are present in your home?  none identified     Hearing impairment: No    In the past 6 months, have you been bothered by leaking of urine? no    Mental Health:    In general, how would you rate your overall mental or emotional health? good  PHQ-2 Score: 1    Do you feel safe in your environment? Yes    Have you ever done Advance Care Planning? (For example, a Health Directive, POLST, or a discussion with a medical provider or your loved ones about your wishes)? No, advance care " planning information given to patient to review.  Patient plans to discuss their wishes with loved ones or provider.      Fall risk:  Fall Risk Assessment not completed.  click delete button to remove this line now  Cognitive Screening: Not done      Do you have sleep apnea, excessive snoring or daytime drowsiness?: no    Current providers sharing in care for this patient include:   Patient Care Team:  Kelle Mauro MD as PCP - General  Kelle Mauro MD as Assigned PCP    Patient has been advised of split billing requirements and indicates understanding: Yes    Pain History:  When did you first notice your pain? - Less than 1 week   Have you seen anyone else for your pain? No  Where in your body do you have pain?     Back Pain  Onset/Duration: 4 days ago  Description:   Location of pain: low back bilateral and going down left leg   Character of pain: sharp, cramping and intermittent  Pain radiation: radiates into the left leg  New numbness or weakness in legs, not attributed to pain: no   Intensity: Currently 5/10  Progression of Symptoms: same  History:   Specific cause: none  Pain interferes with job: YES- PCA  History of back problems: had a back injury in 2019  Any previous MRI or X-rays: None  Sees a specialist for back pain: No  Alleviating factors:   Improved by: Aleve   Precipitating factors:  Worsened by: Nothing  Therapies tried and outcome: NSAIDs    Accompanying Signs & Symptoms:  Risk of Fracture: None  Risk of Cauda Equina: None  Risk of Infection: None  Risk of Cancer: None  Risk of Ankylosing Spondylitis: Onset at age <35, male, AND morning back stiffness  no         PMH, PSH, Meds, Allergies, and FHx were reviewed and updated as needed.           Review of Systems   Negative except as above.    Sexually Active: Yes  Sexual concerns: No   Contraception: Tubal ligation  Pregnancy History: No obstetric history on file.  Menses: July middle, 2021  No  STD History: Neg  Last Pap Smear Date: 8/7/20  NL Pap with neg HPV, pt needs repeat Pap in 5 yrs    Abnormal Pap History: See problem list    Patient Active Problem List   Diagnosis     Health Care Home     Herpes simplex virus (HSV) infection     Illiteracy and low-level literacy     Abnormal Pap smear of cervix     Chlamydia     Lateral epicondylitis of right elbow     Carpal tunnel syndrome of right wrist     Enlarged uterus-Normal US 4/2018       Past Medical History:   Diagnosis Date     Lateral epicondylitis of right elbow 1/25/2017        Family History   Problem Relation Age of Onset     Diabetes Mother      Hypertension Mother      Breast Cancer Mother      Diabetes Maternal Grandmother      Coronary Artery Disease No family hx of      Hyperlipidemia No family hx of      Cancer - colorectal No family hx of      Ovarian Cancer No family hx of      Prostate Cancer No family hx of      Other Cancer No family hx of      Mental Illness No family hx of      Cerebrovascular Disease No family hx of      Anesthesia Reaction No family hx of      Asthma No family hx of      Osteoporosis No family hx of      Known Genetic Syndrome No family hx of      Obesity No family hx of      Unknown/Adopted No family hx of      Colon Cancer No family hx of      Depression No family hx of      Anxiety Disorder No family hx of      Mental Illness No family hx of      Substance Abuse No family hx of      Genetic Disorder No family hx of      Thyroid Disease No family hx of      Cancer No family hx of      Heart Disease No family hx of      Reviewed no other significant FH    Family History and past Medical History reviewed and unchanged/updated.  Social History     Tobacco Use     Smoking status: Former Smoker     Types: Other     Smokeless tobacco: Never Used     Tobacco comment: smokes marijuana   Substance Use Topics     Alcohol use: Yes     Alcohol/week: 0.0 standard drinks     Comment: occ- beer     Single  Children ? yes - 3 girls and 1 son, all adult    Has anyone hurt you  "physically, for example by pushing, hitting, slapping or kicking you or forcing you to have sex? Denies  Do you feel threatened or controlled by a partner, ex-partner or anyone in your life? Denies    RISK BEHAVIORS AND HEALTHY HABITS:  Tobacco Use/Smoking: None  Illicit Drug Use: Occasional (< weekly) marijuana  Do you use alcohol? No  Diet (5-7 servings of fruits/veg daily): No   Exercise (30 min accumulated most days):No  Dental Care: No   Calcium 1500 mg/d:  No  Seat Belt Use: Yes     Cholesterol Level (>46 yo or at risk):  Date done 8/25/2021  Result(s) normal     Pap/HPV cotest every 5 years for women 30-65   Date done 2020  Result(s) normal     HIV screening:  Date done 10/10/2017  Result(s) Negative      Immunization History   Administered Date(s) Administered     HepB 09/23/2011, 12/12/2011, 04/17/2013     Influenza (IIV3) PF 12/05/2011     Mantoux Tuberculin Skin Test 12/12/2011, 01/10/2014, 11/04/2015     TDAP Vaccine (Boostrix) 09/06/2019     Tdap (Adacel,Boostrix) 05/16/2008    Reviewed Immunization Record Today         Physical Exam   /78 (BP Location: Left arm, Patient Position: Sitting, Cuff Size: Adult Large)   Pulse 72   Temp 98.4  F (36.9  C) (Oral)   Resp 16   Ht 1.67 m (5' 5.75\")   Wt 102.7 kg (226 lb 6.4 oz)   SpO2 100%   BMI 36.82 kg/m    GENERAL: healthy, alert and no distress  EYES: Eyes grossly normal to inspection, extraocular movements - intact, and PERRL  HENT: ear canals- normal; TMs- normal; Nose- normal; Mouth- no ulcers  NECK: no tenderness, no adenopathy, no asymmetry, no masses, no stiffness; thyroid- normal to palpation  RESP: lungs clear to auscultation - no rales, no rhonchi, no wheezes  BREAST: no masses, no tenderness, no nipple discharge, no palpable axillary masses or adenopathy  CV: regular rates and rhythm, normal S1 S2, no S3 or S4 and no murmur, no click or rub -  ABDOMEN: soft, no tenderness, normal bowel sounds  MS: extremities- no gross deformities noted, " no edema  SKIN: crusty 0.7 mm nodule on left lower lip  NEURO: strength and tone- normal, sensory exam- grossly normal, mentation- intact, speech- normal, reflexes- symmetric  BACK: no CVA tenderness, no paralumbar tenderness  - female: scabbed over patch on right lower labia minora that is slightly tender to touch  RECTAL- female: no hemorrhoids  PSYCH: Alert and oriented times 3; speech- coherent , normal rate and volume; difficult to articulate logical thoughts or to abstract reason, no tangential thoughts, no hallucinations or delusions, affect- normal  LYMPHATICS: ant. cervical- normal, post. cervical- normal, axillary- normal, supraclavicular- normal,    Results for orders placed or performed in visit on 08/20/21   Chlamydia Trachomatis by PCR ([employer])     Status: Normal    Specimen: Urine, Voided   Result Value Ref Range    Chlamydia trachomatis Negative Negative   Neisseria gonorrhoeae PCR     Status: Normal    Specimen: Urine, Voided   Result Value Ref Range    Neisseria gonorrhoeae Negative Negative       Assessment and Roselyn Yoder was seen today for physical.    Diagnoses and all orders for this visit:    Acute bilateral low back pain with left-sided sciatica  -     naproxen sodium (ANAPROX) 220 MG tablet; Take 1 tablet (220 mg) by mouth 2 times daily (with meals)  -     cyclobenzaprine (FLEXERIL) 5 MG tablet; Take 1 tablet (5 mg) by mouth 3 times daily as needed for muscle spasms    Cold sore  -     docosanol (ABREVA) 10 % CREA cream; Apply topically 5 times daily    Routine screening for STI (sexually transmitted infection)  -     Cancel: Chlamydia/Gono Amplified  -     Chlamydia Trachomatis by PCR ([employer])  -     Neisseria gonorrhoeae PCR    Encounter for screening mammogram for breast cancer  -     MA SCREENING DIGITAL BILAT    Herpes simplex virus infection  -     valACYclovir (VALTREX) 500 MG tablet; Take 1 tablet (500 mg) by mouth 2 times daily for 7 days          Contraception: not  brennan Mauro MD  PGY-2  [facility]  Pager: [phone]  Clinic: [phone]  Fax: [phone]      Precepted today with Dr. Calvert.

## 2021-08-20 NOTE — PATIENT INSTRUCTIONS
Dear Beba Rosa,    Thank you for coming to see us today!     1. Today you were seen for a physical exam   2. Take valacyclovir in the morning and at night for 1 week (7 days)  3. Take cyclobenzaprine at bedtime. You can take 1-2 tablets. Do not drive until you know how it affects you.   4. Take naproxen two times per day for back/leg pain.  5. I placed a referral for a mammogram.   6. I will call you if you any result is abnormal.   7. Please consider getting a COVID vaccine. I recommend it for everyone. It is safe and it works.         As always, please call the clinic if you have any questions or concerns.    Be well,    Dr. Kelle Mauro        Patient Education     Living with Herpes  To speed healing, take care of open herpes sores. To reduce outbreaks, take care of your health. And talk with your healthcare provider about antiviral medicines. To keep from infecting others, get treatment and talk with your provider about medicines to reduce spreading the virus. Learn other ways to prevent spreading the virus.  To ease symptoms    Start episodic treatment at the first sign of symptoms, such as itching or tingling. Call your healthcare provider at the first sign of an outbreak. Starting antiviral medicine in the first 2 days of an outbreak may lead to faster healing.    Take ibuprofen or acetaminophen to limit any pain.    Sit in a warm or cool bath or use a moist compress to reduce the itching of sores. For some women, genital outbreaks cause burning during urination. In such cases, urinating in a tub of warm water helps reduce burning.    Wear white cotton underwear and loose clothing during outbreaks. Don t wear nylon underwear or tight clothes. They can prevent sores from healing.    To speed healing    Wash sores with mild soap and water. Pat (don't rub) the sores completely dry.    Always wash your hands with clean, warm or cold water for at least 20 seconds after touching a sore. Or use an  alcohol-based hand .    Don t bandage sores. Air helps them heal.    Don't use any ointment unless it is prescribed. Using the wrong jelly or cream may hold in moisture and slow healing.    Don t pick at the sores. This can slow healing. It might also get a sore infected.    If you wear contacts, wash your hands well with soap and water before putting them in.    To reduce outbreaks    Eat a balanced diet.    Get plenty of sleep. This helps your immune system work its best.    Limit stress and tension. Both can weaken the body s defenses.    Limit exposure to sun, wind, and extreme heat or cold. Wear sunscreen and lip balm to help prevent outbreaks.    Talk with your healthcare provider about antiviral medicines to reduce outbreaks.    To protect others    Tell your current sex partner and any future partners that you have herpes. If you don t know what to say, ask your healthcare provider for help.    Use a latex condom that covers the affected areas each time you have sex. This reduces the risk of passing herpes to your partner. But remember a condom may not cover all of the areas that have sores. Be certain to put the condom on the right way. Check this CDC website for correct male condom use: www.cdc.gov/male-condom-use. For correct use of female condoms go to www.cdc.gov/female-condom-use.    Don't kiss or have oral sex when you have an oral sore.    Don't have intercourse when genital sores are present. Also keep in mind, herpes can be passed during oral sex and with anal contact.    Don t share towels, toothbrushes, lip balm, or lipstick when you have a sore.    If you have keep having outbreaks, taking daily antiviral medicines can help reduce the likelihood of transmission to your partner.    [employer] last reviewed this educational content on 6/1/2019 2000-2021 [employer]. All rights reserved. This information is not intended as a substitute for professional medical care. Always  follow your healthcare professional's instructions.               Patient Education     Understanding Cold Sores  Cold sores (fever blisters) are small sores or blisters on the lip. Sometimes they are inside the mouth. Many people get them from time to time. Cold sores often are not serious. They often heal in a few days, sometimes longer. They are caused by 2 related viruses, herpes simplex type 1 (HSV-1) and herpes simplex type 2 (HSV-2). These viruses spread very easily. Many people have one or both of these viruses in their body. More than 4 in every 5 people are infected with HSV-1. This is also the most common cause of cold sores. Once you have the virus that causes cold sores, it stays in your body for the rest of your life. But it can be inactive for long periods.   What causes a cold sore?  Cold sores are often caused by HSV-1. In some cases they are caused by HSV-2. HSV-2 is the more common cause of genital sores. The herpes viruses can enter the body through a break in the skin such as a scrape. Or they may enter through mucous membranes such as the lips or mouth. Some ways to get the viruses include:     Kissing someone who has a cold sore    Sharing a drinking glass, eating utensils, or lip balm with someone who has a cold sore    Having sex with someone who has a cold sore  A  baby can also get the infection at birth.  If you have a herpes virus, you can pass it along even when you don t have a sore.   Cold sores flare up from time to time. Things that can cause an outbreak include:     Sun exposure    Fever    Stress or exhaustion    Menstruation    Skin irritation    Another unrelated illness such as pneumonia, urinary infection, or cancer  What are the symptoms of a cold sore?  Symptoms can include:    A blister-like sore or cluster of sores. These often occur at the edge of the lips but may appear inside the mouth.    Skin redness around the sores.    Pain or itching in the area of the  outbreak. Often the pain or itching starts 12 to 24 hours before the sore is visible.    Flu-like symptoms, including swollen glands, headache, body ache, or fever. These typically occur only at the time of the first infection.  Cold sores may also occur on fingers. They may rarely infect the eyes, a serious possible complication.   Some people have symptoms a day or 2 before an outbreak. They may feel soreness, burning, itching, or tingling before a cold sore appears. Cold sores often come back in the same area that they first appeared.   How are cold sores treated?  Treatment for cold sores focuses on easing and shortening symptoms. For people with frequent outbreaks, treatment works to decrease how often and how symptomatic future episodes will be. Treatments may include:     Prescription or over-the-counter pain medicines. These can help with mild pain, especially if sores are inside the mouth.    Antiviral medicines. These may be pills that are taken by mouth or a cream to apply to sores. They may help shorten an outbreak and reduce the severity of symptoms. They may be used to help prevent future outbreaks if you have infections that keep coming back.    Self-care such as extra rest and drinking more fluids. These may help ease the flu-like symptoms of a first outbreak.  How are cold sores diagnosed?  Your healthcare provider makes the diagnosis mainly by looking at the sores and doing a clinical exam. This may be confirmed by swab tests or blood tests.   How can I prevent cold sores?  You can help reduce the spread of the herpes viruses that cause cold sores. This can help prevent both you and others from getting cold sores. Follow these tips:     Don't kiss others if you have a cold sore. Also don't kiss someone with a cold sore.    Don't share eating utensils, lip balm, razors, or towels with someone who has a cold sore.    Wash your hands after touching the area of a cold sore. The herpes virus can be  carried from your face to your hands when you touch the area of a cold sore. When this happens, wash your hands thoroughly, for at least 20 seconds. When you can t wash with soap and water, use an alcohol-based hand .    Disinfect things you touch often, such as phones and keyboards.      If you feel a cold sore coming on, do the same things you would do when a cold sore is present to prevent spreading the virus.    Use condoms to help prevent passing on the viruses through sex.  What are the possible complications of a cold sore?  Cold sores often go away by themselves within a few days. In some cases it may take 1 to 2 weeks or longer. For most people, cold sores are not serious. But the viruses that cause cold sores can cause more serious illness. People who have a weak immune system may get more serious infections from herpes viruses. These include people being treated for cancer or people who have HIV. Babies may also become very ill from a herpes infection.    When should I call my healthcare provider?  Call your healthcare provider right away if you have any of these:    Fever of 100.4 F (38 C) or higher, or as directed by your provider    Pain that gets worse    You can't eat or drink because of painful sores    Symptoms don t get better in 5 to 7 days    Blisters spread beyond the mouth or lip to areas on the chest, arms, face (especially the eyes), or legs  [employer] last reviewed this educational content on 6/1/2019 2000-2021 [employer]. All rights reserved. This information is not intended as a substitute for professional medical care. Always follow your healthcare professional's instructions.

## 2021-08-21 LAB
C TRACH DNA SPEC QL NAA+PROBE: NEGATIVE
N GONORRHOEA DNA SPEC QL NAA+PROBE: NEGATIVE

## 2021-09-25 ENCOUNTER — HEALTH MAINTENANCE LETTER (OUTPATIENT)
Age: 51
End: 2021-09-25

## 2022-03-01 ENCOUNTER — OFFICE VISIT (OUTPATIENT)
Dept: FAMILY MEDICINE | Facility: CLINIC | Age: 52
End: 2022-03-01
Payer: MEDICARE

## 2022-03-01 VITALS
OXYGEN SATURATION: 96 % | BODY MASS INDEX: 37.9 KG/M2 | RESPIRATION RATE: 16 BRPM | SYSTOLIC BLOOD PRESSURE: 129 MMHG | TEMPERATURE: 98.2 F | DIASTOLIC BLOOD PRESSURE: 89 MMHG | WEIGHT: 233 LBS | HEART RATE: 76 BPM

## 2022-03-01 DIAGNOSIS — Z12.31 VISIT FOR SCREENING MAMMOGRAM: Primary | ICD-10-CM

## 2022-03-01 DIAGNOSIS — E56.9 VITAMIN DEFICIENCY: ICD-10-CM

## 2022-03-01 DIAGNOSIS — Z12.11 SPECIAL SCREENING FOR MALIGNANT NEOPLASMS, COLON: ICD-10-CM

## 2022-03-01 DIAGNOSIS — M54.42 ACUTE BILATERAL LOW BACK PAIN WITH LEFT-SIDED SCIATICA: ICD-10-CM

## 2022-03-01 PROCEDURE — 99214 OFFICE O/P EST MOD 30 MIN: CPT | Mod: GC | Performed by: STUDENT IN AN ORGANIZED HEALTH CARE EDUCATION/TRAINING PROGRAM

## 2022-03-01 RX ORDER — CHOLECALCIFEROL (VITAMIN D3) 50 MCG
1 TABLET ORAL DAILY
Qty: 90 TABLET | Refills: 3 | Status: SHIPPED | OUTPATIENT
Start: 2022-03-01 | End: 2023-03-31

## 2022-03-01 RX ORDER — IBUPROFEN 600 MG/1
600 TABLET, FILM COATED ORAL EVERY 6 HOURS PRN
Qty: 90 TABLET | Refills: 3 | Status: SHIPPED | OUTPATIENT
Start: 2022-03-01 | End: 2023-02-09

## 2022-03-01 RX ORDER — CYCLOBENZAPRINE HCL 5 MG
5 TABLET ORAL 3 TIMES DAILY PRN
Qty: 90 TABLET | Refills: 0 | Status: SHIPPED | OUTPATIENT
Start: 2022-03-01 | End: 2022-09-21

## 2022-03-01 NOTE — PROGRESS NOTES
Preceptor Attestation:    I discussed the patient with the resident and evaluated the patient in person. I have verified the content of the note, which accurately reflects my assessment of the patient and the plan of care.   Supervising Physician:  Dmitriy Quintanilla MD.

## 2022-03-01 NOTE — PROGRESS NOTES
"  Assessment & Plan     Acute bilateral low back pain with left-sided sciatica  Stable. Flexeril was helpful in the past. Discussed not taking if possibility of driving. Advised to take ibuprofen with food. Discussed PT. Patient not interested at this time. Could consider referral to pool therapy PT in the future. No acute symptoms today to necessitate prednisone or adjunctive therapies.  - cyclobenzaprine (FLEXERIL) 5 MG tablet; Take 1 tablet (5 mg) by mouth 3 times daily as needed for muscle spasms  - ibuprofen (ADVIL/MOTRIN) 600 MG tablet; Take 1 tablet (600 mg) by mouth every 6 hours as needed for moderate pain    Visit for screening mammogram  Ordered at routine physical 08/2021 but not done. Reordered today. Advised to call clinic if she does not hear from mammogram scheduling team this week.  - MA SCREENING DIGITAL BILAT; Future    Special screening for malignant neoplasms, colon  Did not qualify for FIT per insurance. Will reorder after patient turns 52 years old. Not interested in colonoscopy today.    Vitamin d deficiency  Supplement provided about 1 year ago had an unpleasant taste and was a chewable tablet.   - vitamin D3 (CHOLECALCIFEROL) 50 mcg (2000 units) tablet; Take 1 tablet (50 mcg) by mouth daily    Application for disability parking  Patient understands that she is personally not eligible for parking disability. 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    Review of prior external note(s) from - 08/2021  Prescription drug management  34 minutes spent on the date of the encounter doing chart review, history and exam, documentation and further activities per the note       BMI:   Estimated body mass index is 37.9 kg/m  as calculated from the following:    Height as of 8/20/21: 1.67 m (5' 5.75\").    Weight as of this encounter: 105.7 kg (233 lb).     MEDICATIONS:        - Start taking tizanidine, " vitamin d, ibuprofen       - Continue other medications without change  FURTHER TESTING:       - mammogram       - FIT after turning 52 (unable to order today due to insurance issue)  See Patient Instructions    Staffed with Dr. Quintanilla.     Kelle Mauro MD  [facility]    Subjective     HPI     51-year-old female here for parking disability form and bilateral low back pain with bilateral sciatica. 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 She is requesting a disability parking permit because of the other person's limited mobility.    Back pain: She continues to have low back pain with bilateral sciatica, though this is not getting worse. Denies night sweats or fevers. No red flag symptoms. Flexeril was helpful in the past but she ran out. Does not remember taking NSAIDs. She is still able to perform her duties as a PCA, though with pain. Has never done PT.    Health maintenance: She is due for colonoscopy and mammogram. Mammogram was ordered at her physical in August 2021. She has not scheduled this yet. She has never had a colonoscopy. She declined COVID-19 vaccination. She did not have any questions about COVID-19 vaccine.    Review of Systems   Constitutional, HEENT, cardiovascular, pulmonary, gi and gu systems are negative, except as otherwise noted.      Objective    /89   Pulse 76   Temp 98.2  F (36.8  C) (Oral)   Resp 16   Wt 105.7 kg (233 lb)   SpO2 96%   BMI 37.90 kg/m    Body mass index is 37.9 kg/m .  Physical Exam       GENERAL: healthy, alert and no distress  MS: no gross musculoskeletal defects noted, no edema  Comprehensive back pain exam:  Tenderness of si joints bilaterally, Range of motion not limited by pain and Lower extremity strength functional and equal on both sides    No results found for this or any previous visit (from the past 24 hour(s)).    ----- Service Performed and  Documented by Resident or Fellow ------

## 2022-03-01 NOTE — PATIENT INSTRUCTIONS
Dear Beba Rosa,    Thank you for coming to see us today!    You were seen today for disability parking application     1. For the disability parking, talk to your employer/patient   2. For the back pain, take ibuprofen every 4 hours. Try taking before work. Take this with food.   3. Take tizanidine for back spasms at bedtime or if you don't plan on driving.   4. Start taking vitamin D        As always, please call the clinic if you have any questions or concerns. A doctor or nurse is available 24/7 to answer questions.     Be well,    Dr. Kelle Mauro

## 2022-03-01 NOTE — Clinical Note
Please review your note at the bottom wildcards still present unable to close.  Best wishes,  Dmitriy Quintanilla MD

## 2022-03-12 ENCOUNTER — HEALTH MAINTENANCE LETTER (OUTPATIENT)
Age: 52
End: 2022-03-12

## 2022-08-21 DIAGNOSIS — E66.01 MORBID OBESITY (H): Primary | ICD-10-CM

## 2022-08-21 DIAGNOSIS — R79.9 ABNORMAL FINDING OF BLOOD CHEMISTRY, UNSPECIFIED: ICD-10-CM

## 2022-08-21 DIAGNOSIS — N93.9 ABNORMAL UTERINE BLEEDING: ICD-10-CM

## 2022-08-21 DIAGNOSIS — Z00.00 HEALTHCARE MAINTENANCE: ICD-10-CM

## 2022-09-21 ENCOUNTER — OFFICE VISIT (OUTPATIENT)
Dept: FAMILY MEDICINE | Facility: CLINIC | Age: 52
End: 2022-09-21

## 2022-09-21 ENCOUNTER — ANCILLARY PROCEDURE (OUTPATIENT)
Dept: GENERAL RADIOLOGY | Facility: CLINIC | Age: 52
End: 2022-09-21
Attending: STUDENT IN AN ORGANIZED HEALTH CARE EDUCATION/TRAINING PROGRAM
Payer: MEDICARE

## 2022-09-21 VITALS
TEMPERATURE: 97.6 F | OXYGEN SATURATION: 100 % | WEIGHT: 234.6 LBS | HEIGHT: 66 IN | HEART RATE: 79 BPM | SYSTOLIC BLOOD PRESSURE: 135 MMHG | RESPIRATION RATE: 20 BRPM | BODY MASS INDEX: 37.7 KG/M2 | DIASTOLIC BLOOD PRESSURE: 84 MMHG

## 2022-09-21 DIAGNOSIS — M25.561 RIGHT KNEE PAIN, UNSPECIFIED CHRONICITY: Primary | ICD-10-CM

## 2022-09-21 DIAGNOSIS — E55.9 VITAMIN D DEFICIENCY: ICD-10-CM

## 2022-09-21 DIAGNOSIS — M25.561 RIGHT KNEE PAIN, UNSPECIFIED CHRONICITY: ICD-10-CM

## 2022-09-21 PROCEDURE — 99214 OFFICE O/P EST MOD 30 MIN: CPT | Mod: GC | Performed by: STUDENT IN AN ORGANIZED HEALTH CARE EDUCATION/TRAINING PROGRAM

## 2022-09-21 PROCEDURE — 73560 X-RAY EXAM OF KNEE 1 OR 2: CPT | Mod: TC | Performed by: RADIOLOGY

## 2022-09-21 RX ORDER — CYCLOBENZAPRINE HCL 5 MG
5 TABLET ORAL 3 TIMES DAILY PRN
Qty: 90 TABLET | Refills: 0 | Status: SHIPPED | OUTPATIENT
Start: 2022-09-21 | End: 2023-03-31

## 2022-09-21 RX ORDER — IBUPROFEN 400 MG/1
400 TABLET, FILM COATED ORAL EVERY 6 HOURS PRN
Qty: 60 TABLET | Refills: 1 | Status: SHIPPED | OUTPATIENT
Start: 2022-09-21

## 2022-09-21 NOTE — PROGRESS NOTES
Assessment & Plan     (M25.561) Right knee pain, unspecified chronicity  (primary encounter diagnosis)  Comment: Normal structural knee exam, no concerns on anterior posterior drawer exam.  In the absence of any kind of mechanical trauma low concern for any ligamentous pathology.  Plain film right knee showed no fracture or dislocation mild medial joint space degeneration.  Continue to use ibuprofen and conservative measurements including rest, ice, elevation.  Flexeril was refilled for history of back pain and back spasms.  Plan: XR Knee Right 1/2 Views, ibuprofen         (ADVIL/MOTRIN) 400 MG tablet, cyclobenzaprine         (FLEXERIL) 5 MG tablet    (E55.9) Vitamin D deficiency  Comment: Refilled. Will recheck vitamin D in 6-8 weeks.  Plan: Calcium Carbonate-Vitamin D 300-100 MG-UNIT         CAPS    Return in about 2 weeks (around 10/5/2022), or if symptoms worsen or fail to improve.    Jaylen Goetz MD  [facility] BJ Yoder is a 52 year old, presenting for the following health issues:  Hospital F/U (Follow-up hospital and right leg is painful for 3 days now, and talk to  About her iron med and refill on vitamin D3.  Decline c19 vaccine and flu)    Patient Active Problem List   Diagnosis     Health Care Home     Herpes simplex virus (HSV) infection     Illiteracy and low-level literacy     Abnormal Pap smear of cervix     Chlamydia     Lateral epicondylitis of right elbow     Carpal tunnel syndrome of right wrist     Enlarged uterus-Normal US 4/2018     Current Outpatient Medications   Medication     Calcium Carbonate-Vitamin D 300-100 MG-UNIT CAPS     cyclobenzaprine (FLEXERIL) 5 MG tablet     ibuprofen (ADVIL/MOTRIN) 400 MG tablet     docosanol (ABREVA) 10 % CREA cream     hydrocortisone (CORTAID) 1 % external ointment     ibuprofen (ADVIL/MOTRIN) 600 MG tablet     naproxen (NAPROSYN) 500 MG tablet     naproxen sodium (ANAPROX) 220 MG tablet     triamcinolone  "(KENALOG) 0.1 % external cream     valACYclovir (VALTREX) 500 MG tablet     vitamin D3 (CHOLECALCIFEROL) 50 mcg (2000 units) tablet     No current facility-administered medications for this visit.     HPI   Knee pain :   Duration? Right knee pain, been present for approximately 3 days now.  Initially presented to [facility], unable to complete visit due to long wait for imaging at that time.  She was told by the doctor that she should not take ibuprofen for her knee pain at this time.  Since then she has been taking acetaminophen however this is not significantly improved her pain.  Denies any calf pain, swelling unilaterally.  Injury/ Inciting activity? None   Pop? no   Swelling? no   Limited motion? no   Locking/ Catching? no   Giving way/ instability? no   Imaging? none   Treatment? Acetaminophen    Review of Systems   Constitutional, HEENT, cardiovascular, pulmonary, gi and gu systems are negative, except as otherwise noted.      Objective    /84   Pulse 79   Temp 97.6  F (36.4  C) (Oral)   Resp 20   Ht 1.679 m (5' 6.1\")   Wt 106.4 kg (234 lb 9.6 oz)   SpO2 100%   BMI 37.75 kg/m    Body mass index is 37.75 kg/m .  Physical Exam   Knee:   ROM: 0-130; Crepitus: No  Effusion: No; Swelling: No  Strength: Full in flexion/ extension   Tenderness: Patella -mild medial joint line -yes; Lateral joint line -no; Quad tendon -no; Patellar tendon-no known; Hamstring - no.   Cruciates: anterior drawer - neg/posterior drawer -neg.  Collaterals: varus -neg/valgus -neg.   Patella: patellar compression - neg;  Meniscus: Ivonne - neg;    XR Knee Right 1/2 Views    Result Date: 9/21/2022  EXAM: XR KNEE RIGHT 1/2 VIEWS LOCATION: [facility] DATE/TIME: 9/21/2022 11:28 AM INDICATION:  Right knee pain, unspecified chronicity COMPARISON: None.     IMPRESSION: Minimal narrowing of the medial compartment of the right knee. No evidence for fracture. No significant effusion.    ----- Service " Performed and Documented by Resident or Fellow ------

## 2022-09-21 NOTE — PROGRESS NOTES
Preceptor Attestation:  I discussed the patient with the resident and evaluated the patient in person.   I personally reviewed the imaging and agree with the interpretation documented by the resident.   I have verified the content of the note, which accurately reflects my assessment of the patient and the plan of care.  Supervising Physician:  Danielle Solorio MD.

## 2023-02-01 ENCOUNTER — OFFICE VISIT (OUTPATIENT)
Dept: FAMILY MEDICINE | Facility: CLINIC | Age: 53
End: 2023-02-01
Payer: MEDICARE

## 2023-02-01 VITALS
BODY MASS INDEX: 40.08 KG/M2 | RESPIRATION RATE: 12 BRPM | WEIGHT: 240.6 LBS | DIASTOLIC BLOOD PRESSURE: 87 MMHG | SYSTOLIC BLOOD PRESSURE: 131 MMHG | HEIGHT: 65 IN | HEART RATE: 88 BPM | OXYGEN SATURATION: 100 % | TEMPERATURE: 98.2 F

## 2023-02-01 DIAGNOSIS — N93.9 ABNORMAL UTERINE BLEEDING: Primary | ICD-10-CM

## 2023-02-01 DIAGNOSIS — D25.0 FIBROIDS, SUBMUCOSAL: ICD-10-CM

## 2023-02-01 LAB
ERYTHROCYTE [DISTWIDTH] IN BLOOD BY AUTOMATED COUNT: 12.8 % (ref 10–15)
HCT VFR BLD AUTO: 33.2 % (ref 35–47)
HGB BLD-MCNC: 10.9 G/DL (ref 11.7–15.7)
MCH RBC QN AUTO: 31.2 PG (ref 26.5–33)
MCHC RBC AUTO-ENTMCNC: 32.8 G/DL (ref 31.5–36.5)
MCV RBC AUTO: 95 FL (ref 78–100)
PLATELET # BLD AUTO: 280 10E3/UL (ref 150–450)
RBC # BLD AUTO: 3.49 10E6/UL (ref 3.8–5.2)
WBC # BLD AUTO: 8.2 10E3/UL (ref 4–11)

## 2023-02-01 PROCEDURE — 85027 COMPLETE CBC AUTOMATED: CPT

## 2023-02-01 PROCEDURE — 36415 COLL VENOUS BLD VENIPUNCTURE: CPT

## 2023-02-01 PROCEDURE — 99214 OFFICE O/P EST MOD 30 MIN: CPT | Mod: GC

## 2023-02-01 RX ORDER — HYDROCODONE BITARTRATE AND ACETAMINOPHEN 5; 325 MG/1; MG/1
1 TABLET ORAL EVERY 6 HOURS PRN
Qty: 12 TABLET | Refills: 0 | Status: SHIPPED | OUTPATIENT
Start: 2023-02-01 | End: 2023-02-04

## 2023-02-01 RX ORDER — NAPROXEN 500 MG/1
500 TABLET ORAL 2 TIMES DAILY WITH MEALS
Qty: 30 TABLET | Refills: 0 | Status: SHIPPED | OUTPATIENT
Start: 2023-02-01 | End: 2023-03-31

## 2023-02-01 RX ORDER — HYDROCODONE BITARTRATE AND ACETAMINOPHEN 5; 325 MG/1; MG/1
TABLET ORAL
COMMUNITY
Start: 2023-01-28 | End: 2023-02-01

## 2023-02-01 NOTE — PROGRESS NOTES
Preceptor Attestation:    I discussed the patient with the resident and evaluated the patient in person. I have verified the content of the note, which accurately reflects my assessment of the patient and the plan of care.   Supervising Physician:  Antonio Caba DO.

## 2023-02-01 NOTE — PATIENT INSTRUCTIONS
It was nice to meet you! Here is what we talked about:    - Your ultrasound from September shows a 4.5 cm fibroid in your uterus.   - Elrosa refilled for 12 tablets. We will not be able to refill this again.   - Try Naproxen as needed for the pain, this is prescription strength ibuprofen  - Gynecology referral placed today                  February 2, 2023  [facility]    Phone: [phone]  Fax: [phone]    Faxed demographics, referral, note, and labs to [phone]. They will call patient to schedule.     Cassidy Enrique

## 2023-02-01 NOTE — PROGRESS NOTES
"  Assessment & Plan   Beba is a 52 year old with abnormal heavy uterine bleeding. Complains of heavy bleeding with small clots since December 2022. Has been following with [facility], workup there in September 2022 included pap smear with negative cytology and negative HPV, endometrial biopsy showed weakly proliferative endometrium, negative for chronic endometritis, negative for hyperplasia, atypia, and malignancy, pelvic US in September 2022 showed a 4.5 cm submucosal fibroid. She was evaluated at [place] in the ED on January 28, 2023 for heavy bleeding and pain. Pelvic US at the ED did not mention the fibroid, did show heterogenous endometrial hypertrophy. Blood work included BMP wnl, negative UPT, hemoglobin of 12.0.    Abnormal uterine bleeding  Submucosal fibroid  Prior workup as above. Suspect bleeding and pain is secondary to fibroid. Patient has not been able to follow up with her gynecology office, will send a referral today. VSS. Hemoglobin on 1/28 was 12, but patient has continued to have heavy bleeding, so will recheck today.   - HYDROcodone-acetaminophen (NORCO) 5-325 MG tablet  Dispense: 12 tablet; Refill: 0   - Patient requesting refill. Discussed that we will not refill this medication again after today. Encouraged trying NSAIDs or Tylenol for pain. Patient expressed understanding.   - Ob/Gyn Referral  - naproxen (NAPROSYN) 500 MG tablet  Dispense: 30 tablet; Refill: 0  - CBC with platelets       BMI:   Estimated body mass index is 40.04 kg/m  as calculated from the following:    Height as of this encounter: 1.651 m (5' 5\").    Weight as of this encounter: 109.1 kg (240 lb 9.6 oz).   Weight management plan: Discuss at future appointment    Follow up:  Return if symptoms worsen or fail to improve.     Discussed with attending, Dr. Caba.     Jacquelyn Mcconnell, DO PGY1  [facility]     Chel Yoder is a 52 year old, presenting for the following health " "issues:  Menstrual Problem (Seem at [facility] and pelvic US done, bother some), Refill Request (HYDROcodone-acetaminophen (NORCO) 5-325 MG tablet), and Medication Reconciliation (Med reviewed, needs provider attention)      DONALDO Yoder is a 52 year old female who presents to the clinic today for evaluation of abnormal heavy menses.    First period around 10-11 years old. Periods prior to this were regular. Has never been on any form of birth control including OCPs. Had 4 kids. Has had heavy periods in the past. Last bleeding started in December 2022, is heavy with clots, and has continued since December. Prior, she got one period per month, bleeding lasts for 5-7 days. She was seen at [place] in the ED 1/28/23 due to this heavy painful bleeding. US showed moderate to marked heterogenous thickening of the endometrium with some focal endometrial vascularity and hemorrhagic blood products, difficult to exclude mass lesion and differential considerations include polyp, endometrial hyperplasia or neoplasm, consider female pelvis MRI.  Hemoglobin 12.0. BMP wnl. Negative pregnancy test. Ibuprofen has not helped her pain, Norco helps. Pain currently 8/10, \"not too bad.\" Otherwise, feels well.     On chart review, patient has been seen by [facility] for heavy bleeding 9/28/2022. Pelvis U/S showed a 4.5 cm submucosal fibroid. Had a endometrial biopsy in September 2022 that showed weakly proliferative endometrium, negative for chronic endometritis, negative for hyperplasia, atypia, and malignancy. Had a pap smear in September 2022 with negative cytology, negative HPV. Patient reports she has been unable to schedule a follow up visit with her [facility] gynecology office.     Family history includes positive history of breast cancer in mom, unknown cancer in father. No known family history of bleeding disorders.     Review of Systems   Constitutional, HEENT, cardiovascular, pulmonary, GI, , musculoskeletal, neuro, skin, " "endocrine and psych systems are negative, except as otherwise noted.      Objective    /87 (BP Location: Left arm, Patient Position: Sitting, Cuff Size: Adult Large)   Pulse 88   Temp 98.2  F (36.8  C) (Oral)   Resp 12   Ht 1.651 m (5' 5\")   Wt 109.1 kg (240 lb 9.6 oz)   SpO2 100%   BMI 40.04 kg/m    Body mass index is 40.04 kg/m .  Physical Exam   GENERAL: healthy, alert and no distress  NECK: no adenopathy, no asymmetry   RESP: lungs clear to auscultation - no rales, rhonchi or wheezes  CV: regular rate and rhythm, normal S1 S2, no S3 or S4, no murmur, click or rub, no peripheral edema   ABDOMEN: soft, diffusely tender to palpation, no hepatosplenomegaly, bowel sounds normal  MS: no gross musculoskeletal defects noted, no edema    ----- Service Performed and Documented by Resident or Fellow ------            "

## 2023-02-06 NOTE — RESULT ENCOUNTER NOTE
Patient is inform of lab result. Will hold off on any add treatment per provider. Check with patient, staet she have a appt schedule with OBGYN. No add question ask. Miley Enrique, CMA

## 2023-02-09 ENCOUNTER — OFFICE VISIT (OUTPATIENT)
Dept: OBGYN | Facility: CLINIC | Age: 53
End: 2023-02-09
Payer: MEDICARE

## 2023-02-09 VITALS
WEIGHT: 236 LBS | HEIGHT: 65 IN | HEART RATE: 82 BPM | DIASTOLIC BLOOD PRESSURE: 78 MMHG | OXYGEN SATURATION: 96 % | BODY MASS INDEX: 39.32 KG/M2 | SYSTOLIC BLOOD PRESSURE: 124 MMHG

## 2023-02-09 DIAGNOSIS — D25.0 SUBMUCOUS UTERINE FIBROID: ICD-10-CM

## 2023-02-09 DIAGNOSIS — N95.1 PERIMENOPAUSAL: Primary | ICD-10-CM

## 2023-02-09 PROCEDURE — 99204 OFFICE O/P NEW MOD 45 MIN: CPT | Performed by: OBSTETRICS & GYNECOLOGY

## 2023-02-09 RX ORDER — NORGESTIMATE AND ETHINYL ESTRADIOL 0.25-0.035
1 KIT ORAL DAILY
Qty: 84 TABLET | Refills: 4 | Status: SHIPPED | OUTPATIENT
Start: 2023-02-09

## 2023-02-09 NOTE — PROGRESS NOTES
CC: Beba Rosa is here secondary to abnormal bleeding.    HPI: The pt is a 52 year old SAAF P4 who presents with abnormal bleeding since Dec.  Prior to Dec her periods were monthly lasting 4-5 days.  Starting in Dec her bleeding was daily until 4-5 days ago.  She had some pain in the low pelvis at the same time.  OTC medications weren't helpful; she was given prescriptions for naprosyn and Vicodin, which were both equally helpful with the pain.  She has been having heavier periods since at least the fall.  Ultrasound was done on 9/14/22; it showed a 5.6 x 4.1 x 3.6 cm submucosal fibroid with a 4.4 cm endometrium that was distorted by the fibroid.  Neither ovary was seen.  She had an endometrial biopsy on 9/28/22 that showed a weakly proliferative endometrium.  Additional ultrasound was done 1/28/23; it didn't show a discrete mass but the uterus overall was enlarged.  The endometrium measured 3 cm (by the worksheet, typo in the actual report) and was diffusely thickened.  The left ovary wasn't seen.  The right was not well seen but appeared normal.  Her HgB on 1/28/23 was 12; on 2/1/23 it was 10.9.    Past Medical History:   Diagnosis Date     Lateral epicondylitis of right elbow 1/25/2017       No past surgical history on file.    Patient's   Family History   Problem Relation Age of Onset     Diabetes Mother      Hypertension Mother      Breast Cancer Mother      Cancer Father      Diabetes Maternal Grandmother      No Known Problems Son      No Known Problems Daughter      No Known Problems Daughter      No Known Problems Daughter      Coronary Artery Disease No family hx of      Hyperlipidemia No family hx of      Cancer - colorectal No family hx of      Ovarian Cancer No family hx of      Prostate Cancer No family hx of      Other Cancer No family hx of      Mental Illness No family hx of      Cerebrovascular Disease No family hx of      Anesthesia Reaction No family hx of      Asthma No family hx of       Osteoporosis No family hx of      Known Genetic Syndrome No family hx of      Obesity No family hx of      Unknown/Adopted No family hx of      Colon Cancer No family hx of      Depression No family hx of      Anxiety Disorder No family hx of      Mental Illness No family hx of      Substance Abuse No family hx of      Genetic Disorder No family hx of      Thyroid Disease No family hx of      Heart Disease No family hx of        Patient   Social History     Socioeconomic History     Marital status: Single     Spouse name: None     Number of children: None     Years of education: None     Highest education level: None   Tobacco Use     Smoking status: Former     Types: Other     Smokeless tobacco: Never     Tobacco comments:     smokes marijuana   Substance and Sexual Activity     Alcohol use: Not Currently     Comment: occ- beer     Drug use: Yes     Types: Marijuana       Outpatient Medications Prior to Visit   Medication Sig Dispense Refill     vitamin D3 (CHOLECALCIFEROL) 50 mcg (2000 units) tablet Take 1 tablet (50 mcg) by mouth daily 90 tablet 3     Calcium Carbonate-Vitamin D 300-100 MG-UNIT CAPS Take 1 tablet by mouth daily (Patient not taking: Reported on 2/9/2023) 90 capsule 3     cyclobenzaprine (FLEXERIL) 5 MG tablet Take 1 tablet (5 mg) by mouth 3 times daily as needed for muscle spasms (Patient not taking: Reported on 2/9/2023) 90 tablet 0     docosanol (ABREVA) 10 % CREA cream Apply topically 5 times daily (Patient not taking: Reported on 3/1/2022) 2 g 0     hydrocortisone (CORTAID) 1 % external ointment Apply topically 2 times daily (Patient not taking: Reported on 3/1/2022) 56 g 0     ibuprofen (ADVIL/MOTRIN) 400 MG tablet Take 1 tablet (400 mg) by mouth every 6 hours as needed for moderate pain (4-6) (Patient not taking: Reported on 2/9/2023) 60 tablet 1     naproxen (NAPROSYN) 500 MG tablet Take 1 tablet (500 mg) by mouth 2 times daily (with meals) (Patient not taking: Reported on 2/9/2023) 30  "tablet 0     triamcinolone (KENALOG) 0.1 % external cream Apply topically 2 times daily (Patient not taking: Reported on 8/20/2021) 45 g 0     valACYclovir (VALTREX) 500 MG tablet Take 1 tablet (500 mg) by mouth 2 times daily for 7 days 14 tablet 0     ibuprofen (ADVIL/MOTRIN) 600 MG tablet Take 1 tablet (600 mg) by mouth every 6 hours as needed for moderate pain (Patient not taking: Reported on 2/9/2023) 90 tablet 3     No facility-administered medications prior to visit.       Patient is allergic to tizanidine.    ROS:  12 part ROS is negative aside from those symptoms in the HPI    PE:  /78 (BP Location: Right arm, Patient Position: Sitting, Cuff Size: Adult Regular)   Pulse 82   Ht 1.651 m (5' 5\")   Wt 107 kg (236 lb)   LMP 12/13/2022 (Approximate)           Body mass index is 39.27 kg/m .    General: obese AAF, NAD  Psych: normal mood  Neuro: CN I-XII grossly intact  MS: normal gait    Assessment: 52 year old SAAF P4 with abnormal bleeding, likely related to both a submucosal fibroid and perimenopausal changes    Plan: Fibroids were discussed with her.  Natural history of hormonal changes, fibroids, and menorrhagia discussed with the patient.  We discussed options including hormonal contraception (OCPs, Depo Provera, and Mirena), Lysteda, and endometrial ablation.  We discussed that the Mirena and ablation aren't good options for her with the fibroid.  We discussed uterine artery embolization as an option for the fibroid.  We discussed the pros and cons of the hormonal methods, the fact that Lysteda works for about 70% of women, and that the UAE is usually an outpatient procedure but can need one night in the hospital.  We also discussed that IR would need an MRI before doing the UAE consult.  After discussing all the pros and cons, she decided she'd like to start with OCPs.  She has been on them in the past.  Prescription was sent in for her.  We discussed checking her blood pressure 2-3 weeks after " starting them.  She will let me know if there are issues.  Questions were answered to the best of my ability.    51 minutes spent on the date of the encounter doing chart review, review of outside records, review of test results, interpretation of tests, patient visit and documentation

## 2023-03-31 ENCOUNTER — OFFICE VISIT (OUTPATIENT)
Dept: FAMILY MEDICINE | Facility: CLINIC | Age: 53
End: 2023-03-31
Payer: MEDICARE

## 2023-03-31 VITALS
BODY MASS INDEX: 40.15 KG/M2 | OXYGEN SATURATION: 100 % | RESPIRATION RATE: 12 BRPM | HEART RATE: 80 BPM | SYSTOLIC BLOOD PRESSURE: 116 MMHG | HEIGHT: 65 IN | WEIGHT: 241 LBS | DIASTOLIC BLOOD PRESSURE: 82 MMHG | TEMPERATURE: 98.1 F

## 2023-03-31 DIAGNOSIS — E56.9 VITAMIN DEFICIENCY: ICD-10-CM

## 2023-03-31 DIAGNOSIS — Z12.31 VISIT FOR SCREENING MAMMOGRAM: ICD-10-CM

## 2023-03-31 DIAGNOSIS — R03.0 ELEVATED BLOOD PRESSURE READING WITHOUT DIAGNOSIS OF HYPERTENSION: Primary | ICD-10-CM

## 2023-03-31 DIAGNOSIS — G47.9 SLEEPING DIFFICULTY: ICD-10-CM

## 2023-03-31 DIAGNOSIS — M25.561 RIGHT KNEE PAIN, UNSPECIFIED CHRONICITY: ICD-10-CM

## 2023-03-31 DIAGNOSIS — Z12.11 SCREEN FOR COLON CANCER: ICD-10-CM

## 2023-03-31 DIAGNOSIS — Z23 NEED FOR SHINGLES VACCINE: ICD-10-CM

## 2023-03-31 DIAGNOSIS — N93.9 ABNORMAL UTERINE BLEEDING: ICD-10-CM

## 2023-03-31 PROCEDURE — 99214 OFFICE O/P EST MOD 30 MIN: CPT | Mod: GC | Performed by: STUDENT IN AN ORGANIZED HEALTH CARE EDUCATION/TRAINING PROGRAM

## 2023-03-31 RX ORDER — NAPROXEN 500 MG/1
500 TABLET ORAL 2 TIMES DAILY WITH MEALS
Qty: 30 TABLET | Refills: 0 | Status: SHIPPED | OUTPATIENT
Start: 2023-03-31

## 2023-03-31 RX ORDER — CYCLOBENZAPRINE HCL 5 MG
5 TABLET ORAL 3 TIMES DAILY PRN
Qty: 90 TABLET | Refills: 0 | Status: SHIPPED | OUTPATIENT
Start: 2023-03-31

## 2023-03-31 RX ORDER — CHOLECALCIFEROL (VITAMIN D3) 50 MCG
1 TABLET ORAL DAILY
Qty: 90 TABLET | Refills: 3 | Status: SHIPPED | OUTPATIENT
Start: 2023-03-31

## 2023-03-31 RX ORDER — HYDROXYZINE HYDROCHLORIDE 25 MG/1
25 TABLET, FILM COATED ORAL 3 TIMES DAILY PRN
Qty: 90 TABLET | Refills: 0 | Status: SHIPPED | OUTPATIENT
Start: 2023-03-31

## 2023-03-31 NOTE — PATIENT INSTRUCTIONS
Take naproxen 2 times per day with food for sciatica  Use heating pad, warm bath, etc for comfort    Continue physical activity as able    Take hydroxyzine as needed for sleep or together with pain medication    Take blood pressure in the morning    If your back pain isn't better in 1 week, come back to see me    Schedule the mammogram and colonoscopy

## 2023-03-31 NOTE — PROGRESS NOTES
Preceptor Attestation:    I discussed the patient with the resident and evaluated the patient in person. I have verified the content of the note, which accurately reflects my assessment of the patient and the plan of care.   Supervising Physician:  West Sarmiento MD.

## 2023-04-03 ENCOUNTER — ANCILLARY ORDERS (OUTPATIENT)
Dept: FAMILY MEDICINE | Facility: CLINIC | Age: 53
End: 2023-04-03

## 2023-04-03 DIAGNOSIS — Z12.31 SCREENING MAMMOGRAM FOR BREAST CANCER: ICD-10-CM

## 2023-04-22 ENCOUNTER — HEALTH MAINTENANCE LETTER (OUTPATIENT)
Age: 53
End: 2023-04-22

## 2023-10-27 DIAGNOSIS — N93.9 ABNORMAL UTERINE BLEEDING: ICD-10-CM

## 2023-10-27 RX ORDER — NAPROXEN 500 MG/1
500 TABLET ORAL 2 TIMES DAILY WITH MEALS
Qty: 30 TABLET | Refills: 0 | OUTPATIENT
Start: 2023-10-27

## 2024-06-29 ENCOUNTER — HEALTH MAINTENANCE LETTER (OUTPATIENT)
Age: 54
End: 2024-06-29

## 2025-03-11 NOTE — PROGRESS NOTES
Assessment & Plan   Encounter Date: Mar 31, 2023    Screen for colon cancer  - Colonoscopy Screening  Referral; Future    Visit for screening mammogram  - MA SCREENING DIGITAL BILAT - Future  (s+30); Future    Vitamin deficiency  Stable.  Refilled.  - vitamin D3 (CHOLECALCIFEROL) 50 mcg (2000 units) tablet; Take 1 tablet (50 mcg) by mouth daily    Abnormal uterine bleeding  She does have a uterine fibroid.  We did not discuss her recent visit with OB/GYN, but I reviewed the note after our visit.  Blood pressure is within normal range.  If she is taking OCPs, normal blood pressure is observed.  - naproxen (NAPROSYN) 500 MG tablet; Take 1 tablet (500 mg) by mouth 2 times daily (with meals)    Right knee pain, unspecified chronicity  She has had Flexeril prescribed before.  Does not remember if it was helpful.  Given her lower extremity symptoms, discussed trying Flexeril at bedtime to see if it helps with sleep.  - cyclobenzaprine (FLEXERIL) 5 MG tablet; Take 1 tablet (5 mg) by mouth 3 times daily as needed for muscle spasms    Elevated blood pressure reading without diagnosis of hypertension  Normal in clinic today.  Requested DME blood pressure cuff, which was ordered for her today.  - Home Blood Pressure Monitor Order for DME - ONLY FOR DME    Sleeping difficulty  Discussed sleep hygiene including no screen time 2 hours before bed, minimizing fluids before bed, using white noise machine, and using phone torito for white noise.  Also trial hydroxyzine.  She will return to clinic to follow-up if no significant improvement.  Would evaluate for JAYE and mood symptoms at future visit if concerns continue.  - hydrOXYzine (ATARAX) 25 MG tablet; Take 1 tablet (25 mg) by mouth 3 times daily as needed for itching          Follow-Up:   Return in 2 weeks if symptoms persist or sooner if worsen    Kelle MD [place] PGY-3   [facility]   Staffed with Dr. Sarmiento.     "Polo Small, a 64 y.o. male presents to the ED w/ complaint of left knee pain and swelling. Pt reports pain 8/10. Denies trauma to knee. Pt states "I think I have some fluid in there". Pt ambulatory.     "     ______________________________________________________    Subjective     HPI:  Ms. Beba Rosa is a(n) 52 year old female who presents today as a return patient for:  Hypertension (BP check, have an episode of light headache yesterday), Leg Problem (Have slight pain on right leg, have symptoms for a week), and Medication Reconciliation (Med reviewed, taking prenatal vitamin and are currently slowing down on intake)    Right leg symptoms: Reports pain/tingling in right leg, especially on the lateral and posterior aspect.  No falls or trauma.  Has not had this before.  No fever.    Blood pressure: She has had some elevated blood pressure readings and has had recent mild headache.  Blood pressure is within normal range today.  She does not take any antihypertensive medications.  She did recently start OCPs for fibroids after seeing Dr. Maria on 2/9/2023 and was recommended to check her blood pressure 2 to 3 weeks after starting them.  We did not discuss this during her visit, this was information I gathered after the visit in chart review.    Sleeping difficulty: Reports difficulty staying asleep, especially when woken up by neighbors.    Preventive Care:  Discussed recommended colon and breast cancer screenings.  Colonoscopy prep discussed.  Mammogram ordered.    Patient is otherwise feeling well, without additional concerns.      Data Reviewed:    Office Visit on 02/01/2023   Component Date Value Ref Range Status     WBC Count 02/01/2023 8.2  4.0 - 11.0 10e3/uL Final     RBC Count 02/01/2023 3.49 (L)  3.80 - 5.20 10e6/uL Final     Hemoglobin 02/01/2023 10.9 (L)  11.7 - 15.7 g/dL Final     Hematocrit 02/01/2023 33.2 (L)  35.0 - 47.0 % Final     MCV 02/01/2023 95  78 - 100 fL Final     MCH 02/01/2023 31.2  26.5 - 33.0 pg Final     MCHC 02/01/2023 32.8  31.5 - 36.5 g/dL Final     RDW 02/01/2023 12.8  10.0 - 15.0 % Final     Platelet Count 02/01/2023 280  150 - 450 10e3/uL Final     No results  "found.    Medications:  Current Outpatient Medications   Medication     Calcium Carbonate-Vitamin D 300-100 MG-UNIT CAPS     cyclobenzaprine (FLEXERIL) 5 MG tablet     docosanol (ABREVA) 10 % CREA cream     hydrocortisone (CORTAID) 1 % external ointment     ibuprofen (ADVIL/MOTRIN) 400 MG tablet     naproxen (NAPROSYN) 500 MG tablet     norgestimate-ethinyl estradiol (ORTHO-CYCLEN) 0.25-35 MG-MCG tablet     triamcinolone (KENALOG) 0.1 % external cream     valACYclovir (VALTREX) 500 MG tablet     vitamin D3 (CHOLECALCIFEROL) 50 mcg (2000 units) tablet     No current facility-administered medications for this visit.      Past Medical History:   Patient Active Problem List   Diagnosis     Health Care Home     Herpes simplex virus (HSV) infection     Illiteracy and low-level literacy     Abnormal Pap smear of cervix     Chlamydia     Lateral epicondylitis of right elbow     Carpal tunnel syndrome of right wrist     Enlarged uterus-Normal US 4/2018     Abnormal uterine bleeding     Fibroids, submucosal     Past Medical History:   Diagnosis Date     Lateral epicondylitis of right elbow 1/25/2017              Objective     /82 (BP Location: Left arm, Patient Position: Sitting, Cuff Size: Adult Large)   Pulse 80   Temp 98.1  F (36.7  C) (Oral)   Resp 12   Ht 1.651 m (5' 5\")   Wt 109.3 kg (241 lb)   LMP 12/13/2022 (Approximate)   SpO2 100%   BMI 40.10 kg/m      Pertinent Physical Exam Findings    Gen: Pleasant female in no acute distress.  Appears stated age.  Casually groomed.   MSK: Laying flat on her back on exam table elicits pain.  Patient's symptoms of burning/tingling in her right leg is provoked with resistance against raise leg.  Straight leg test is positive on the right as well.  Straight leg test negative on the left.  Strength is 5 out of 5 in lower extremities bilaterally.  Patellar reflexes are 2+ and symmetric.        No results found for any visits on 03/31/23.             ----- Service " Performed and Documented by Resident or Fellow ------

## 2025-05-11 ENCOUNTER — HEALTH MAINTENANCE LETTER (OUTPATIENT)
Age: 55
End: 2025-05-11

## 2025-06-30 ENCOUNTER — OFFICE VISIT (OUTPATIENT)
Dept: FAMILY MEDICINE | Facility: CLINIC | Age: 55
End: 2025-06-30
Payer: MEDICARE

## 2025-06-30 DIAGNOSIS — Z12.31 ENCOUNTER FOR SCREENING MAMMOGRAM FOR MALIGNANT NEOPLASM OF BREAST: ICD-10-CM

## 2025-06-30 DIAGNOSIS — Z00.00 ENCOUNTER FOR MEDICARE ANNUAL WELLNESS EXAM: Primary | ICD-10-CM

## 2025-06-30 SDOH — HEALTH STABILITY: PHYSICAL HEALTH: ON AVERAGE, HOW MANY DAYS PER WEEK DO YOU ENGAGE IN MODERATE TO STRENUOUS EXERCISE (LIKE A BRISK WALK)?: 0 DAYS

## 2025-06-30 ASSESSMENT — SOCIAL DETERMINANTS OF HEALTH (SDOH): HOW OFTEN DO YOU GET TOGETHER WITH FRIENDS OR RELATIVES?: ONCE A WEEK

## 2025-06-30 NOTE — PROGRESS NOTES
Preceptor Attestation:    I discussed the patient with the resident and evaluated the patient in person. I have verified the content of the note, which accurately reflects my assessment of the patient and the plan of care.   Supervising Physician:  Luis Carlos Mauricio MD.

## 2025-06-30 NOTE — PATIENT INSTRUCTIONS
Patient Education   Preventive Care Advice   This is general advice given by our system to help you stay healthy. However, your care team may have specific advice just for you. Please talk to your care team about your preventive care needs.  Nutrition  Eat 5 or more servings of fruits and vegetables each day.  Try wheat bread, brown rice and whole grain pasta (instead of white bread, rice, and pasta).  Get enough calcium and vitamin D. Check the label on foods and aim for 100% of the RDA (recommended daily allowance).  Lifestyle  Exercise at least 150 minutes each week  (30 minutes a day, 5 days a week).  Do muscle strengthening activities 2 days a week. These help control your weight and prevent disease.  No smoking.  Wear sunscreen to prevent skin cancer.  Have a dental exam and cleaning every 6 months.  Yearly exams  See your health care team every year to talk about:  Any changes in your health.  Any medicines your care team has prescribed.  Preventive care, family planning, and ways to prevent chronic diseases.  Shots (vaccines)   HPV shots (up to age 26), if you've never had them before.  Hepatitis B shots (up to age 59), if you've never had them before.  COVID-19 shot: Get this shot when it's due.  Flu shot: Get a flu shot every year.  Tetanus shot: Get a tetanus shot every 10 years.  Pneumococcal, hepatitis A, and RSV shots: Ask your care team if you need these based on your risk.  Shingles shot (for age 50 and up)  General health tests  Diabetes screening:  Starting at age 35, Get screened for diabetes at least every 3 years.  If you are younger than age 35, ask your care team if you should be screened for diabetes.  Cholesterol test: At age 39, start having a cholesterol test every 5 years, or more often if advised.  Bone density scan (DEXA): At age 50, ask your care team if you should have this scan for osteoporosis (brittle bones).  Hepatitis C: Get tested at least once in your life.  STIs (sexually  transmitted infections)  Before age 24: Ask your care team if you should be screened for STIs.  After age 24: Get screened for STIs if you're at risk. You are at risk for STIs (including HIV) if:  You are sexually active with more than one person.  You don't use condoms every time.  You or a partner was diagnosed with a sexually transmitted infection.  If you are at risk for HIV, ask about PrEP medicine to prevent HIV.  Get tested for HIV at least once in your life, whether you are at risk for HIV or not.  Cancer screening tests  Cervical cancer screening: If you have a cervix, begin getting regular cervical cancer screening tests starting at age 21.  Breast cancer scan (mammogram): If you've ever had breasts, begin having regular mammograms starting at age 40. This is a scan to check for breast cancer.  Colon cancer screening: It is important to start screening for colon cancer at age 45.  Have a colonoscopy test every 10 years (or more often if you're at risk) Or, ask your provider about stool tests like a FIT test every year or Cologuard test every 3 years.  To learn more about your testing options, visit:   .  For help making a decision, visit:   https://bit.ly/gz13420.  Prostate cancer screening test: If you have a prostate, ask your care team if a prostate cancer screening test (PSA) at age 55 is right for you.  Lung cancer screening: If you are a current or former smoker ages 50 to 80, ask your care team if ongoing lung cancer screenings are right for you.  For informational purposes only. Not to replace the advice of your health care provider. Copyright   2023 [facility]. All rights reserved. Clinically reviewed by the [facility] Transitions Program. [employer] 921044 - REV 01/24.  Learning About Activities of Daily Living  What are activities of daily living?     Activities of daily living (ADLs) are the basic self-care tasks you do every day. These include eating, bathing, dressing,  and moving around.  As you age, and if you have health problems, you may find that it's harder to do some of these tasks. If so, your doctor can suggest ideas that may help.  To measure what kind of help you may need, your doctor will ask how well you are able to do ADLs. Let your doctor know if there are any tasks that you are having trouble doing. This is an important first step to getting help. And when you have the help you need, you can stay as independent as possible.  How will a doctor assess your ADLs?  Asking about ADLs is part of a routine health checkup your doctor will likely do as you age. Your health check might be done in a doctor's office, in your home, or at a hospital. The goal is to find out if you are having any problems that could make it hard to care for yourself or that make it unsafe for you to be on your own.  To measure your ADLs, your doctor will ask how hard it is for you to do routine tasks. Your doctor may also want to know if you have changed the way you do a task because of a health problem. Your doctor may watch how you:  Walk back and forth.  Keep your balance while you stand or walk.  Move from sitting to standing or from a bed to a chair.  Button or unbutton a shirt or sweater.  Remove and put on your shoes.  It's common to feel a little worried or anxious if you find you can't do all the things you used to be able to do. Talking with your doctor about ADLs is a way to make sure you're as safe as possible and able to care for yourself as well as you can. You may want to bring a caregiver, friend, or family member to your checkup. They can help you talk to your doctor.  Follow-up care is a key part of your treatment and safety. Be sure to make and go to all appointments, and call your doctor if you are having problems. It's also a good idea to know your test results and keep a list of the medicines you take.  Current as of: October 24, 2024  Content Version: 14.5    3716-1470  [employer].   Care instructions adapted under license by your healthcare professional. If you have questions about a medical condition or this instruction, always ask your healthcare professional. [employer] disclaims any warranty or liability for your use of this information.    Substance Use Disorder: Care Instructions  Overview     You can improve your life and health by stopping your use of alcohol or drugs. When you don't drink or use drugs, you may feel and sleep better. You may get along better with your family, friends, and coworkers. There are medicines and programs that can help with substance use disorder.  How can you care for yourself at home?  Here are some ways to help you stay sober and prevent relapse.  If you have been given medicine to help keep you sober or reduce your cravings, be sure to take it exactly as prescribed.  Talk to your doctor about programs that can help you stop using drugs or drinking alcohol.  Do not keep alcohol or drugs in your home.  Plan ahead. Think about what you'll say if other people ask you to drink or use drugs. Try not to spend time with people who drink or use drugs.  Use the time and money spent on drinking or drugs to do something that's important to you.  Preventing a relapse  Have a plan to deal with relapse. Learn to recognize changes in your thinking that lead you to drink or use drugs. Get help before you start to drink or use drugs again.  Try to stay away from situations, friends, or places that may lead you to drink or use drugs.  If you feel the need to drink alcohol or use drugs again, seek help right away. Call a trusted friend or family member. Some people get support from organizations such as Narcotics Anonymous or [employer] or from treatment facilities.  If you relapse, get help as soon as you can. Some people make a plan with another person that outlines what they want that person to do for them if they relapse. The plan  usually includes how to handle the relapse and who to notify in case of relapse.  Don't give up. Remember that a relapse doesn't mean that you have failed. Use the experience to learn the triggers that lead you to drink or use drugs. Then quit again. Recovery is a lifelong process. Many people have several relapses before they are able to quit for good.  Follow-up care is a key part of your treatment and safety. Be sure to make and go to all appointments, and call your doctor if you are having problems. It's also a good idea to know your test results and keep a list of the medicines you take.  When should you call for help?   Call 911  anytime you think you may need emergency care. For example, call if you or someone else:    Has overdosed or has withdrawal signs. Be sure to tell the emergency workers that you are or someone else is using or trying to quit using drugs. Overdose or withdrawal signs may include:  Losing consciousness.  Seizure.  Seeing or hearing things that aren't there (hallucinations).     Is thinking or talking about suicide or harming others.   Where to get help 24 hours a day, 7 days a week   If you or someone you know talks about suicide, self-harm, a mental health crisis, a substance use crisis, or any other kind of emotional distress, get help right away. You can:    Call the Suicide and Crisis Lifeline at 118.     Call 0-081-280-QRHO (1-276.512.9153).     Text HOME to 471210 to access the Crisis Text Line.   Consider saving these numbers in your phone.  Go to [employer] for more information or to chat online.  Call your doctor now or seek immediate medical care if:    You are having withdrawal symptoms. These may include nausea or vomiting, sweating, shakiness, and anxiety.   Watch closely for changes in your health, and be sure to contact your doctor if:    You have a relapse.     You need more help or support to stop.   Where can you learn more?  Go to  "https://www.healthKidZui.net/patiented  Enter H573 in the search box to learn more about \"Substance Use Disorder: Care Instructions.\"  Current as of: August 20, 2024  Content Version: 14.5 2024-2025 [employer].   Care instructions adapted under license by your healthcare professional. If you have questions about a medical condition or this instruction, always ask your healthcare professional. [employer] disclaims any warranty or liability for your use of this information.       "

## 2025-06-30 NOTE — PROGRESS NOTES
Preventive Care Visit  [facility]  Bouchra Johnson MD, Family Medicine  Jun 30, 2025      Assessment & Plan     Encounter for Medicare annual wellness exam  Encounter for screening mammogram for malignant neoplasm of breast    - MA Screening Bilateral w/ [place]  - Age appropriate lifestyle counseling completed as noted below  - Declined colonoscopy/FIT, Lung cancer CT Screening, Pap (will return at a later date for pap)      Patient has been advised of split billing requirements and indicates understanding: Yes      Reviewed preventive health counseling, as reflected in patient instructions  Special attention given to:        Contraception       Colorectal Cancer Screening       (Yael)menopause management       Advance Care Planning    Counseling  Appropriate preventive services were addressed with this patient via screening, questionnaire, or discussion as appropriate for fall prevention, nutrition, physical activity, Tobacco-use cessation, social engagement, weight loss and cognition.  Checklist reviewing preventive services available has been given to the patient.  Reviewed patient's diet, addressing concerns and/or questions.   The patient was instructed to see the dentist every 6 months.   Updated plan of care.  Patient reported difficulty with activities of daily living were addressed today.Addressed any concerns about safety while driving, patient currently doesn't drive but rather uses the bus.  She does not endorse difficulty driving, just doesn't have access to a car.       Follow-up  Return in about 1 month (around 7/30/2025) for Pap smear.    Chel Yoder is a 55 year old, presenting for the following:  Physical (Annual wellness check up )        9/21/2022    10:51 AM   Additional Questions   Roomed by raman villela   Accompanied by self          HPI  Overall feels her health is good, no new illnesses or diagnoses. Not active, interested in joining the [facility] as she heard her  insurance may cover the membership fee. She was taking ortho-cyclen in the past for control of heavy menstrual bleeding. Still having periods that are irregular and heavy flow, however has family history of breast cancer and given age she would like to hold off on taking this again. She does not have a dentist, discussed importance of establishing care.              Advance Care Planning    Discussed advance care planning with patient; however, patient declined at this time.        6/30/2025   General Health   How would you rate your overall physical health? Good   Feel stress (tense, anxious, or unable to sleep) Only a little   (!) STRESS CONCERN      6/30/2025   Nutrition   Diet: Regular (no restrictions)         6/30/2025   Exercise   Days per week of moderate/strenous exercise 0 days   (!) EXERCISE CONCERN      6/30/2025   Social Factors   Frequency of gathering with friends or relatives Once a week   Worry food won't last until get money to buy more Yes   Food not last or not have enough money for food? Yes   Do you have housing? (Housing is defined as stable permanent housing and does not include staying outside in a car, in a tent, in an abandoned building, in an overnight shelter, or couch-surfing.) No -- She has a stable apartment where she feels safe living   Are you worried about losing your housing? No   Lack of transportation? No   Unable to get utilities (heat,electricity)? No   Want help with housing or utility concern? No   (!) FOOD SECURITY CONCERN PRESENT(!) HOUSING CONCERN PRESENT        6/30/2025   Fall Risk   Fallen 2 or more times in the past year? No   Trouble with walking or balance? No          6/30/2025   Activities of Daily Living- Home Safety   Needs help with the following daily activites Transportation   Do you have the help that you need? (!) NO   Safety concerns in the home None of the above         6/30/2025   Dental   Dentist two times every year? (!) NO         6/30/2025   Hearing  Screening   Hearing concerns? None of the above         6/30/2025   Driving Risk Screening   Patient/family members have concerns about driving (!) YES - not currently driving, using bus - no concerns about driving (answered in error)         6/30/2025   General Alertness/Fatigue Screening   Have you been more tired than usual lately? No         6/30/2025   Urinary Incontinence Screening   Bothered by leaking urine in past 6 months No     Today's PHQ-2 Score:       6/30/2025     1:03 PM   PHQ-2 ( 1999 [employer])   Q1: Little interest or pleasure in doing things 0   Q2: Feeling down, depressed or hopeless 0   PHQ-2 Score 0    Q1: Little interest or pleasure in doing things Not at all   Q2: Feeling down, depressed or hopeless Not at all   PHQ-2 Score 0       Patient-reported           6/30/2025   Substance Use   Alcohol more than 3/day or more than 7/wk No   Do you have a current opioid prescription? No   How severe/bad is pain from 1 to 10? 0/10 (No Pain)   Do you use any other substances recreationally? (!) SYNTHETIC MARIJUANA     Several times/week smoking marijuana, provided counseling on the risks associated with smoking any substance including marijuana.    Social History     Tobacco Use    Smoking status: Former     Types: Other    Smokeless tobacco: Never    Tobacco comments:     smokes marijuana   Vaping Use    Vaping status: Never Used   Substance Use Topics    Alcohol use: Not Currently     Comment: occ- beer    Drug use: Yes     Types: Marijuana          Mammogram Screening - Mammogram every 1-2 years updated in Health Maintenance based on mutual decision making ordered today - fhx of breast cancer in her mother, had her last in 2017 which was negative.    History of abnormal Pap smear: No - age 23 - 67 - Patient with other risk factor requiring more frequent screening - see link Cervical Cytology Screening Guidelines .  Patient declined pap smear today but educated on importance of early detection via  screening. She will make an appointment to have this done in the coming months. Last pap path/HPV as below.        Latest Ref Rng & Units 2020     3:45 PM 2015     2:32 PM   PAP / HPV   PAP   Atypical squamous cells of undetermined significance, cannot exclude HSIL  Electronically signed by Tyler Schultz MD PhD on 2015 at  8:31 AM      HPV 16 DNA NEG Negative     HPV 18 DNA NEG Negative     Other HR HPV NEG Negative       ASCVD Risk   The ASCVD Risk score (Diogo ARTEAGA, et al., 2019) failed to calculate for the following reasons:    The systolic blood pressure is missing  Est <5% via MDCalc    Fracture Risk Assessment Tool  Link to Frax Calculator  Use the information below to complete the Frax calculator  : 1970  Sex: female  Weight (kg): Patient weight not available.  Height (cm): 0 cm  Previous Fragility Fracture:  No  History of parent with fractured hip:  No  Current Smoking:  No  Patient has been on glucocorticoids for more than 3 months (5mg/day or more): No  Rheumatoid Arthritis on Problem List:  No  Secondary Osteoporosis on Problem List:  No  Consumes 3 or more units of alcohol per day: No  Femoral Neck BMD (g/cm2)    3.4% risk of major osteoporotic fracture  0.2% risk of hip fracture    Reviewed and updated as needed this visit by Provider   Tobacco  Allergies  Meds  Problems  Med Hx  Surg Hx  Fam Hx     Sexual Activity          Past Medical History:   Diagnosis Date    Lateral epicondylitis of right elbow 2017     History reviewed. No pertinent surgical history.  OB History    Para Term  AB Living   4 4 4 0 0 4   SAB IAB Ectopic Multiple Live Births   0 0 0 0 0      # Outcome Date GA Lbr Gideon/2nd Weight Sex Type Anes PTL Lv   4 Term            3 Term            2 Term            1 Term              Current providers sharing in care for this patient include:  Patient Care Team:  Indio Hollis DO as PCP - General (Student in organized health care  "education/training program)  Indio Hollis DO as Assigned PCP    The following health maintenance items are reviewed in Epic and correct as of today:  Health Maintenance   Topic Date Due    COLORECTAL CANCER SCREENING  Never done    HEPATITIS C SCREENING  Never done    MAMMO SCREENING  06/13/2019    PNEUMOCOCCAL VACCINE 50+ YEARS (1 of 1 - PCV) Never done    ZOSTER VACCINE (1 of 2) Never done    LUNG CANCER SCREENING  Never done    DIABETES SCREENING  08/25/2023    COVID-19 VACCINE (1 - 2024-25 season) Never done    LIPID  08/25/2025    INFLUENZA VACCINE (Season Ended) 09/01/2025    MEDICARE ANNUAL WELLNESS VISIT  06/30/2026    HPV TEST  09/28/2027    PAP  09/28/2027    DTAP/TDAP/TD VACCINE (3 - Td or Tdap) 09/06/2029    ADVANCE CARE PLANNING  06/30/2030    HIV SCREENING  Completed    PHQ-2 (once per calendar year)  Completed    HEPATITIS B VACCINE  Completed    HPV VACCINE  Aged Out    MENINGITIS VACCINE  Aged Out       Review of Systems  Constitutional, HEENT, cardiovascular, pulmonary, gi and gu systems are negative, except as otherwise noted.     Objective    Exam  LMP 06/03/2025 (Approximate)    Estimated body mass index is 40.1 kg/m  as calculated from the following:    Height as of 3/31/23: 1.651 m (5' 5\").    Weight as of 3/31/23: 109.3 kg (241 lb).    Physical Exam  GENERAL: alert and no distress  EYES: Eyes grossly normal to inspection, PERRL and conjunctivae and sclerae normal  NECK: no adenopathy, no asymmetry, masses, or scars  RESP: lungs clear to auscultation - no rales, rhonchi or wheezes  CV: regular rate and rhythm, normal S1 S2, no S3 or S4, no murmur, click or rub, no peripheral edema  MS: no gross musculoskeletal defects noted, no edema           Signed Electronically by: Bouchra Johnson MD    "

## 2025-07-01 ENCOUNTER — TELEPHONE (OUTPATIENT)
Dept: FAMILY MEDICINE | Facility: CLINIC | Age: 55
End: 2025-07-01
Payer: MEDICARE

## 2025-07-01 ENCOUNTER — PATIENT OUTREACH (OUTPATIENT)
Dept: CARE COORDINATION | Facility: CLINIC | Age: 55
End: 2025-07-01
Payer: MEDICARE

## 2025-07-01 NOTE — TELEPHONE ENCOUNTER
General Call      Reason for Call:  CALL BACK     What are your questions or concerns:  She got a referral for a mammogram but she got a call from someone today about it but they are telling her she is going to have to pay out of pocket for the appointment she doesn't understand why if this is preventative care. She would like to talk to  or her nurse as to how he can get referred to a mammogram clinic where her insure will cover.    Date of last appointment with provider: 6/30/25    Could we send this information to you in [employer] or would you prefer to receive a phone call?:   Patient would prefer a phone call   Okay to leave a detailed message?: Yes at Home number on file [phone] (home)      Does this patient currently have active insurance coverage?  Yes, Pt has active insurance coverage.     Does patient or caller know when to expect a call? Yes, Nurses will return call within 2-3 business hours.    Jane Villarreal on 7/1/2025 at 4:41 PM

## 2025-07-02 NOTE — TELEPHONE ENCOUNTER
Spoke with patient and pt is scheduled with [facility] for her Mammogram.     Michelle Bauman on 7/2/2025 at 12:13 PM

## 2025-07-24 ENCOUNTER — TELEPHONE (OUTPATIENT)
Dept: FAMILY MEDICINE | Facility: CLINIC | Age: 55
End: 2025-07-24
Payer: MEDICARE

## 2025-07-24 NOTE — TELEPHONE ENCOUNTER
Results communicated to patient  with understanding and no further questions.     Bladimir Bernard, RN, MSN

## 2025-07-24 NOTE — TELEPHONE ENCOUNTER
Test Results        Who ordered the test:  ?    Type of test: Lab and Mammogram    Date of test:  7/15    Where was the test performed:      What are your questions/concerns?:  she would like a call back with her mammogram results.    Could we send this information to you in [employer] or would you prefer to receive a phone call?:   Patient would prefer a phone call   Okay to leave a detailed message?: Yes at Home number on file [phone] (home)      Does this patient currently have active insurance coverage?  Yes, Pt has active insurance coverage.     Does patient or caller know when to expect a call? Yes, Nurses will return call within 2-3 business hours.    Jane Villarreal on 7/24/2025 at 8:40 AM

## 2025-08-13 ENCOUNTER — PATIENT OUTREACH (OUTPATIENT)
Dept: CARE COORDINATION | Facility: CLINIC | Age: 55
End: 2025-08-13
Payer: MEDICARE